# Patient Record
Sex: FEMALE | NOT HISPANIC OR LATINO | URBAN - METROPOLITAN AREA
[De-identification: names, ages, dates, MRNs, and addresses within clinical notes are randomized per-mention and may not be internally consistent; named-entity substitution may affect disease eponyms.]

---

## 2017-05-26 ENCOUNTER — CHARTING TRANS (OUTPATIENT)
Dept: FAMILY MEDICINE | Age: 67
End: 2017-05-26

## 2017-05-31 ENCOUNTER — IMAGING SERVICES (OUTPATIENT)
Dept: OTHER | Age: 67
End: 2017-05-31

## 2017-06-01 ENCOUNTER — CHARTING TRANS (OUTPATIENT)
Dept: OTHER | Age: 67
End: 2017-06-01

## 2017-12-19 ENCOUNTER — IMAGING SERVICES (OUTPATIENT)
Dept: OTHER | Age: 67
End: 2017-12-19

## 2018-11-28 VITALS
RESPIRATION RATE: 16 BRPM | DIASTOLIC BLOOD PRESSURE: 70 MMHG | WEIGHT: 226 LBS | OXYGEN SATURATION: 99 % | TEMPERATURE: 97.3 F | SYSTOLIC BLOOD PRESSURE: 148 MMHG | HEART RATE: 84 BPM

## 2022-06-09 ENCOUNTER — OFFICE VISIT (OUTPATIENT)
Dept: FAMILY MEDICINE CLINIC | Facility: CLINIC | Age: 72
End: 2022-06-09
Payer: MEDICARE

## 2022-06-09 VITALS
RESPIRATION RATE: 16 BRPM | SYSTOLIC BLOOD PRESSURE: 136 MMHG | WEIGHT: 220 LBS | BODY MASS INDEX: 35.36 KG/M2 | OXYGEN SATURATION: 98 % | DIASTOLIC BLOOD PRESSURE: 80 MMHG | TEMPERATURE: 98 F | HEART RATE: 74 BPM | HEIGHT: 66 IN

## 2022-06-09 DIAGNOSIS — Z23 NEED FOR PNEUMOCOCCAL VACCINATION: ICD-10-CM

## 2022-06-09 DIAGNOSIS — Z12.31 ENCOUNTER FOR SCREENING MAMMOGRAM FOR MALIGNANT NEOPLASM OF BREAST: ICD-10-CM

## 2022-06-09 DIAGNOSIS — Z12.11 SCREENING FOR COLON CANCER: ICD-10-CM

## 2022-06-09 DIAGNOSIS — H90.0 CONDUCTIVE HEARING LOSS, BILATERAL: ICD-10-CM

## 2022-06-09 DIAGNOSIS — Z23 NEED FOR ZOSTER VACCINATION: ICD-10-CM

## 2022-06-09 DIAGNOSIS — Z13.6 SCREENING FOR CARDIOVASCULAR CONDITION: ICD-10-CM

## 2022-06-09 DIAGNOSIS — Z13.820 OSTEOPOROSIS SCREENING: ICD-10-CM

## 2022-06-09 DIAGNOSIS — Z13.1 SCREENING FOR DIABETES MELLITUS (DM): ICD-10-CM

## 2022-06-09 DIAGNOSIS — K62.5 RECTAL BLEEDING: ICD-10-CM

## 2022-06-09 DIAGNOSIS — Z11.59 NEED FOR HEPATITIS C SCREENING TEST: ICD-10-CM

## 2022-06-09 DIAGNOSIS — Z00.00 ENCOUNTER FOR ANNUAL HEALTH EXAMINATION: Primary | ICD-10-CM

## 2022-06-09 RX ORDER — ZOSTER VACCINE RECOMBINANT, ADJUVANTED 50 MCG/0.5
50 KIT INTRAMUSCULAR ONCE
Qty: 1 EACH | Refills: 1 | Status: SHIPPED | OUTPATIENT
Start: 2022-06-09 | End: 2022-06-09

## 2022-06-09 RX ORDER — ASPIRIN 81 MG/1
81 TABLET ORAL DAILY
Qty: 365 TABLET | Refills: 0 | Status: CANCELLED | OUTPATIENT
Start: 2022-06-09 | End: 2023-06-09

## 2022-06-09 RX ORDER — ERGOCALCIFEROL (VITAMIN D2) 200 MCG/ML
DROPS ORAL AS DIRECTED
COMMUNITY

## 2022-06-16 LAB
ABSOLUTE BASOPHILS: 73 CELLS/UL (ref 0–200)
ABSOLUTE EOSINOPHILS: 317 CELLS/UL (ref 15–500)
ABSOLUTE LYMPHOCYTES: 2039 CELLS/UL (ref 850–3900)
ABSOLUTE MONOCYTES: 469 CELLS/UL (ref 200–950)
ABSOLUTE NEUTROPHILS: 3703 CELLS/UL (ref 1500–7800)
ALBUMIN/GLOBULIN RATIO: 1.5 (CALC) (ref 1–2.5)
ALBUMIN: 3.9 G/DL (ref 3.6–5.1)
ALKALINE PHOSPHATASE: 76 U/L (ref 37–153)
ALT: 9 U/L (ref 6–29)
AST: 11 U/L (ref 10–35)
BASOPHILS: 1.1 %
BILIRUBIN, TOTAL: 0.4 MG/DL (ref 0.2–1.2)
BUN: 20 MG/DL (ref 7–25)
CALCIUM: 9.5 MG/DL (ref 8.6–10.4)
CARBON DIOXIDE: 25 MMOL/L (ref 20–32)
CHLORIDE: 104 MMOL/L (ref 98–110)
CHOL/HDLC RATIO: 4.8 (CALC)
CHOLESTEROL, TOTAL: 218 MG/DL
CREATININE: 0.9 MG/DL (ref 0.6–0.93)
EGFR IF AFRICN AM: 74 ML/MIN/1.73M2
EGFR IF NONAFRICN AM: 64 ML/MIN/1.73M2
EOSINOPHILS: 4.8 %
GLOBULIN: 2.6 G/DL (CALC) (ref 1.9–3.7)
GLUCOSE: 213 MG/DL (ref 65–99)
HDL CHOLESTEROL: 45 MG/DL
HEMATOCRIT: 40.9 % (ref 35–45)
HEMOGLOBIN A1C: 9.1 % OF TOTAL HGB
HEMOGLOBIN: 12.9 G/DL (ref 11.7–15.5)
LDL-CHOLESTEROL: 147 MG/DL (CALC)
LYMPHOCYTES: 30.9 %
MCH: 26.8 PG (ref 27–33)
MCHC: 31.5 G/DL (ref 32–36)
MCV: 85 FL (ref 80–100)
MONOCYTES: 7.1 %
MPV: 9.8 FL (ref 7.5–12.5)
NEUTROPHILS: 56.1 %
NON-HDL CHOLESTEROL: 173 MG/DL (CALC)
PLATELET COUNT: 305 THOUSAND/UL (ref 140–400)
POTASSIUM: 4.6 MMOL/L (ref 3.5–5.3)
PROTEIN, TOTAL: 6.5 G/DL (ref 6.1–8.1)
RDW: 13.1 % (ref 11–15)
RED BLOOD CELL COUNT: 4.81 MILLION/UL (ref 3.8–5.1)
SIGNAL TO CUT-OFF: 0.03
SODIUM: 137 MMOL/L (ref 135–146)
TRIGLYCERIDES: 135 MG/DL
TSH W/REFLEX TO FT4: 1.85 MIU/L (ref 0.4–4.5)
WHITE BLOOD CELL COUNT: 6.6 THOUSAND/UL (ref 3.8–10.8)

## 2023-01-18 ENCOUNTER — ANESTHESIA EVENT (OUTPATIENT)
Dept: ENDOSCOPY | Facility: HOSPITAL | Age: 73
End: 2023-01-18
Payer: MEDICARE

## 2023-01-18 ENCOUNTER — ANESTHESIA (OUTPATIENT)
Dept: ENDOSCOPY | Facility: HOSPITAL | Age: 73
End: 2023-01-18
Payer: MEDICARE

## 2023-01-18 ENCOUNTER — HOSPITAL ENCOUNTER (OUTPATIENT)
Facility: HOSPITAL | Age: 73
Setting detail: HOSPITAL OUTPATIENT SURGERY
Discharge: HOME | End: 2023-01-18
Attending: INTERNAL MEDICINE | Admitting: INTERNAL MEDICINE
Payer: MEDICARE

## 2023-01-18 VITALS
TEMPERATURE: 98 F | HEART RATE: 74 BPM | DIASTOLIC BLOOD PRESSURE: 83 MMHG | OXYGEN SATURATION: 99 % | RESPIRATION RATE: 16 BRPM | SYSTOLIC BLOOD PRESSURE: 143 MMHG | WEIGHT: 220 LBS | BODY MASS INDEX: 34.53 KG/M2 | HEIGHT: 67 IN

## 2023-01-18 DIAGNOSIS — Z12.11 SCREEN FOR COLON CANCER: ICD-10-CM

## 2023-01-18 PROCEDURE — 0DJD8ZZ INSPECTION OF LOWER INTESTINAL TRACT, VIA NATURAL OR ARTIFICIAL OPENING ENDOSCOPIC: ICD-10-PCS | Performed by: INTERNAL MEDICINE

## 2023-01-18 RX ORDER — NALOXONE HYDROCHLORIDE 0.4 MG/ML
80 INJECTION, SOLUTION INTRAMUSCULAR; INTRAVENOUS; SUBCUTANEOUS AS NEEDED
Status: DISCONTINUED | OUTPATIENT
Start: 2023-01-18 | End: 2023-01-18

## 2023-01-18 RX ORDER — HYDROMORPHONE HYDROCHLORIDE 1 MG/ML
0.6 INJECTION, SOLUTION INTRAMUSCULAR; INTRAVENOUS; SUBCUTANEOUS EVERY 5 MIN PRN
Status: DISCONTINUED | OUTPATIENT
Start: 2023-01-18 | End: 2023-01-18

## 2023-01-18 RX ORDER — HYDROMORPHONE HYDROCHLORIDE 1 MG/ML
0.2 INJECTION, SOLUTION INTRAMUSCULAR; INTRAVENOUS; SUBCUTANEOUS EVERY 5 MIN PRN
Status: DISCONTINUED | OUTPATIENT
Start: 2023-01-18 | End: 2023-01-18

## 2023-01-18 RX ORDER — SODIUM CHLORIDE, SODIUM LACTATE, POTASSIUM CHLORIDE, CALCIUM CHLORIDE 600; 310; 30; 20 MG/100ML; MG/100ML; MG/100ML; MG/100ML
INJECTION, SOLUTION INTRAVENOUS CONTINUOUS
Status: DISCONTINUED | OUTPATIENT
Start: 2023-01-18 | End: 2023-01-18

## 2023-01-18 RX ORDER — LIDOCAINE HYDROCHLORIDE 10 MG/ML
INJECTION, SOLUTION EPIDURAL; INFILTRATION; INTRACAUDAL; PERINEURAL AS NEEDED
Status: DISCONTINUED | OUTPATIENT
Start: 2023-01-18 | End: 2023-01-18 | Stop reason: SURG

## 2023-01-18 RX ORDER — HYDROMORPHONE HYDROCHLORIDE 1 MG/ML
0.4 INJECTION, SOLUTION INTRAMUSCULAR; INTRAVENOUS; SUBCUTANEOUS EVERY 5 MIN PRN
Status: DISCONTINUED | OUTPATIENT
Start: 2023-01-18 | End: 2023-01-18

## 2023-01-18 RX ADMIN — SODIUM CHLORIDE, SODIUM LACTATE, POTASSIUM CHLORIDE, CALCIUM CHLORIDE: 600; 310; 30; 20 INJECTION, SOLUTION INTRAVENOUS at 08:34:00

## 2023-01-18 RX ADMIN — LIDOCAINE HYDROCHLORIDE 25 MG: 10 INJECTION, SOLUTION EPIDURAL; INFILTRATION; INTRACAUDAL; PERINEURAL at 08:36:00

## 2023-01-18 RX ADMIN — SODIUM CHLORIDE, SODIUM LACTATE, POTASSIUM CHLORIDE, CALCIUM CHLORIDE: 600; 310; 30; 20 INJECTION, SOLUTION INTRAVENOUS at 08:58:00

## 2023-05-17 ENCOUNTER — HOSPITAL ENCOUNTER (OUTPATIENT)
Dept: BONE DENSITY | Age: 73
Discharge: HOME | End: 2023-05-17
Attending: FAMILY MEDICINE
Payer: MEDICARE

## 2023-05-17 ENCOUNTER — HOSPITAL ENCOUNTER (OUTPATIENT)
Dept: MAMMOGRAPHY | Age: 73
Discharge: HOME | End: 2023-05-17
Attending: FAMILY MEDICINE
Payer: MEDICARE

## 2023-05-17 DIAGNOSIS — Z13.820 OSTEOPOROSIS SCREENING: ICD-10-CM

## 2023-05-17 DIAGNOSIS — Z12.31 ENCOUNTER FOR SCREENING MAMMOGRAM FOR MALIGNANT NEOPLASM OF BREAST: ICD-10-CM

## 2023-05-17 PROCEDURE — 77080 DXA BONE DENSITY AXIAL: CPT | Performed by: FAMILY MEDICINE

## 2023-05-17 PROCEDURE — 77063 BREAST TOMOSYNTHESIS BI: CPT | Performed by: FAMILY MEDICINE

## 2023-05-17 PROCEDURE — 77067 SCR MAMMO BI INCL CAD: CPT | Performed by: FAMILY MEDICINE

## 2023-06-13 ENCOUNTER — OFFICE VISIT (OUTPATIENT)
Dept: FAMILY MEDICINE CLINIC | Facility: CLINIC | Age: 73
End: 2023-06-13
Payer: MEDICARE

## 2023-06-13 VITALS
OXYGEN SATURATION: 99 % | BODY MASS INDEX: 34.61 KG/M2 | WEIGHT: 220.5 LBS | HEART RATE: 84 BPM | DIASTOLIC BLOOD PRESSURE: 80 MMHG | TEMPERATURE: 97 F | SYSTOLIC BLOOD PRESSURE: 148 MMHG | HEIGHT: 67 IN | RESPIRATION RATE: 18 BRPM

## 2023-06-13 DIAGNOSIS — M85.851 OSTEOPENIA OF BOTH HIPS: ICD-10-CM

## 2023-06-13 DIAGNOSIS — H90.0 CONDUCTIVE HEARING LOSS, BILATERAL: ICD-10-CM

## 2023-06-13 DIAGNOSIS — Z00.00 ENCOUNTER FOR ANNUAL HEALTH EXAMINATION: Primary | ICD-10-CM

## 2023-06-13 DIAGNOSIS — E11.9 TYPE 2 DIABETES MELLITUS WITHOUT COMPLICATION, WITHOUT LONG-TERM CURRENT USE OF INSULIN (HCC): ICD-10-CM

## 2023-06-13 DIAGNOSIS — M85.852 OSTEOPENIA OF BOTH HIPS: ICD-10-CM

## 2023-06-13 LAB
CARTRIDGE LOT#: 595 NUMERIC
HEMOGLOBIN A1C: 9.7 % (ref 4.3–5.6)

## 2023-06-13 RX ORDER — BLOOD SUGAR DIAGNOSTIC
STRIP MISCELLANEOUS
Qty: 100 STRIP | Refills: 0 | Status: SHIPPED | OUTPATIENT
Start: 2023-06-13

## 2023-06-13 RX ORDER — LISINOPRIL 10 MG/1
10 TABLET ORAL DAILY
Qty: 90 TABLET | Refills: 3 | Status: SHIPPED | OUTPATIENT
Start: 2023-06-13 | End: 2024-06-07

## 2023-06-13 RX ORDER — METFORMIN HYDROCHLORIDE 500 MG/1
500 TABLET, EXTENDED RELEASE ORAL 2 TIMES DAILY WITH MEALS
Qty: 180 TABLET | Refills: 0 | Status: SHIPPED | OUTPATIENT
Start: 2023-06-13 | End: 2023-09-11

## 2023-06-13 RX ORDER — ATORVASTATIN CALCIUM 10 MG/1
10 TABLET, FILM COATED ORAL NIGHTLY
Qty: 90 TABLET | Refills: 0 | Status: SHIPPED | OUTPATIENT
Start: 2023-06-13 | End: 2023-09-11

## 2023-06-13 RX ORDER — BLOOD-GLUCOSE METER
EACH MISCELLANEOUS
Qty: 1 KIT | Refills: 0 | Status: SHIPPED | OUTPATIENT
Start: 2023-06-13

## 2023-06-21 ENCOUNTER — HOSPITAL ENCOUNTER (OUTPATIENT)
Dept: MAMMOGRAPHY | Facility: HOSPITAL | Age: 73
Discharge: HOME | End: 2023-06-21
Attending: FAMILY MEDICINE
Payer: MEDICARE

## 2023-06-21 DIAGNOSIS — R92.2 INCONCLUSIVE MAMMOGRAM: ICD-10-CM

## 2023-06-21 PROCEDURE — 76642 ULTRASOUND BREAST LIMITED: CPT | Performed by: FAMILY MEDICINE

## 2023-06-22 ENCOUNTER — TELEPHONE (OUTPATIENT)
Dept: FAMILY MEDICINE CLINIC | Facility: CLINIC | Age: 73
End: 2023-06-22

## 2023-06-22 DIAGNOSIS — N60.01 CYST (SOLITARY) OF BREAST, RIGHT: Primary | ICD-10-CM

## 2023-06-22 NOTE — TELEPHONE ENCOUNTER
Recd request and authorization from [facility] -Chart Review Request. Request is for all records from 1/1/22 to present. Please fax to [phone]. Faxed to [employer] for processing.

## 2023-07-10 ENCOUNTER — HOSPITAL ENCOUNTER (EMERGENCY)
Facility: HOSPITAL | Age: 73
Discharge: HOME | End: 2023-07-10
Attending: EMERGENCY MEDICINE
Payer: MEDICARE

## 2023-07-10 VITALS
OXYGEN SATURATION: 95 % | BODY MASS INDEX: 34.53 KG/M2 | RESPIRATION RATE: 20 BRPM | HEIGHT: 67 IN | HEART RATE: 68 BPM | DIASTOLIC BLOOD PRESSURE: 82 MMHG | TEMPERATURE: 98 F | SYSTOLIC BLOOD PRESSURE: 158 MMHG | WEIGHT: 220 LBS

## 2023-07-10 DIAGNOSIS — G44.209 TENSION HEADACHE: ICD-10-CM

## 2023-07-10 DIAGNOSIS — I10 PRIMARY HYPERTENSION: Primary | ICD-10-CM

## 2023-07-10 LAB
ALBUMIN SERPL-MCNC: 3.7 G/DL (ref 3.4–5)
ALBUMIN/GLOB SERPL: 0.9 {RATIO} (ref 1–2)
ALP LIVER SERPL-CCNC: 77 U/L
ALT SERPL-CCNC: 16 U/L
ANION GAP SERPL CALC-SCNC: 6 MMOL/L (ref 0–18)
AST SERPL-CCNC: 21 U/L (ref 15–37)
BASOPHILS # BLD AUTO: 0.07 X10(3) UL (ref 0–0.2)
BASOPHILS NFR BLD AUTO: 0.9 %
BILIRUB SERPL-MCNC: 0.4 MG/DL (ref 0.1–2)
BUN BLD-MCNC: 19 MG/DL (ref 7–18)
CALCIUM BLD-MCNC: 9.5 MG/DL (ref 8.5–10.1)
CHLORIDE SERPL-SCNC: 110 MMOL/L (ref 98–112)
CO2 SERPL-SCNC: 23 MMOL/L (ref 21–32)
CREAT BLD-MCNC: 0.98 MG/DL
EOSINOPHIL # BLD AUTO: 0.32 X10(3) UL (ref 0–0.7)
EOSINOPHIL NFR BLD AUTO: 4.2 %
ERYTHROCYTE [DISTWIDTH] IN BLOOD BY AUTOMATED COUNT: 13.2 %
GFR SERPLBLD BASED ON 1.73 SQ M-ARVRAT: 61 ML/MIN/1.73M2 (ref 60–?)
GLOBULIN PLAS-MCNC: 4 G/DL (ref 2.8–4.4)
GLUCOSE BLD-MCNC: 153 MG/DL (ref 70–99)
HCT VFR BLD AUTO: 40.3 %
HGB BLD-MCNC: 13 G/DL
IMM GRANULOCYTES # BLD AUTO: 0.02 X10(3) UL (ref 0–1)
IMM GRANULOCYTES NFR BLD: 0.3 %
LYMPHOCYTES # BLD AUTO: 2.03 X10(3) UL (ref 1–4)
LYMPHOCYTES NFR BLD AUTO: 26.5 %
MCH RBC QN AUTO: 26.6 PG (ref 26–34)
MCHC RBC AUTO-ENTMCNC: 32.3 G/DL (ref 31–37)
MCV RBC AUTO: 82.4 FL
MONOCYTES # BLD AUTO: 0.55 X10(3) UL (ref 0.1–1)
MONOCYTES NFR BLD AUTO: 7.2 %
NEUTROPHILS # BLD AUTO: 4.67 X10 (3) UL (ref 1.5–7.7)
NEUTROPHILS # BLD AUTO: 4.67 X10(3) UL (ref 1.5–7.7)
NEUTROPHILS NFR BLD AUTO: 60.9 %
OSMOLALITY SERPL CALC.SUM OF ELEC: 293 MOSM/KG (ref 275–295)
PLATELET # BLD AUTO: 290 10(3)UL (ref 150–450)
POTASSIUM SERPL-SCNC: 4.5 MMOL/L (ref 3.5–5.1)
PROT SERPL-MCNC: 7.7 G/DL (ref 6.4–8.2)
RBC # BLD AUTO: 4.89 X10(6)UL
SODIUM SERPL-SCNC: 139 MMOL/L (ref 136–145)
WBC # BLD AUTO: 7.7 X10(3) UL (ref 4–11)

## 2023-07-10 PROCEDURE — 93005 ELECTROCARDIOGRAM TRACING: CPT

## 2023-07-10 PROCEDURE — 96376 TX/PRO/DX INJ SAME DRUG ADON: CPT

## 2023-07-10 PROCEDURE — 85025 COMPLETE CBC W/AUTO DIFF WBC: CPT | Performed by: EMERGENCY MEDICINE

## 2023-07-10 PROCEDURE — 93010 ELECTROCARDIOGRAM REPORT: CPT

## 2023-07-10 PROCEDURE — 80053 COMPREHEN METABOLIC PANEL: CPT | Performed by: EMERGENCY MEDICINE

## 2023-07-10 PROCEDURE — 96374 THER/PROPH/DIAG INJ IV PUSH: CPT

## 2023-07-10 PROCEDURE — 99284 EMERGENCY DEPT VISIT MOD MDM: CPT

## 2023-07-10 PROCEDURE — 99285 EMERGENCY DEPT VISIT HI MDM: CPT

## 2023-07-10 RX ORDER — AMLODIPINE BESYLATE 5 MG/1
5 TABLET ORAL DAILY
Qty: 30 TABLET | Refills: 0 | Status: SHIPPED | OUTPATIENT
Start: 2023-07-10 | End: 2023-07-13

## 2023-07-10 RX ORDER — LABETALOL HYDROCHLORIDE 5 MG/ML
20 INJECTION, SOLUTION INTRAVENOUS ONCE
Status: COMPLETED | OUTPATIENT
Start: 2023-07-10 | End: 2023-07-10

## 2023-07-10 RX ORDER — KETOROLAC TROMETHAMINE 30 MG/ML
30 INJECTION, SOLUTION INTRAMUSCULAR; INTRAVENOUS ONCE
Status: DISCONTINUED | OUTPATIENT
Start: 2023-07-10 | End: 2023-07-11

## 2023-07-11 ENCOUNTER — TELEPHONE (OUTPATIENT)
Dept: FAMILY MEDICINE CLINIC | Facility: CLINIC | Age: 73
End: 2023-07-11

## 2023-07-11 LAB
ATRIAL RATE: 87 BPM
P AXIS: 31 DEGREES
P-R INTERVAL: 162 MS
Q-T INTERVAL: 356 MS
QRS DURATION: 76 MS
QTC CALCULATION (BEZET): 428 MS
R AXIS: -24 DEGREES
T AXIS: 41 DEGREES
VENTRICULAR RATE: 87 BPM

## 2023-07-11 NOTE — TELEPHONE ENCOUNTER
- Pt is needing an ER follow up for high BP and developed cough. Pt only wants to see . No appointment available. Please advise Joycelyn 30. [phone]  Vee daughter. Can leave a message if no answer.

## 2023-07-11 NOTE — TELEPHONE ENCOUNTER
Future Appointments   Date Time Provider [facility] [place]   7/13/2023 11:00 AM Bg Peters, DO [facility] [address] [facility] [address]   9/14/2023  1:00 PM Acosta Pinto, DO [facility] [address] [facility] [address]

## 2023-07-11 NOTE — TELEPHONE ENCOUNTER
Can we see if she can come in on 7/13/23? There is a blocked spot at 11am, you can use it if she can come in then.

## 2023-07-13 ENCOUNTER — TELEPHONE (OUTPATIENT)
Dept: FAMILY MEDICINE CLINIC | Facility: CLINIC | Age: 73
End: 2023-07-13

## 2023-07-13 ENCOUNTER — OFFICE VISIT (OUTPATIENT)
Dept: FAMILY MEDICINE CLINIC | Facility: CLINIC | Age: 73
End: 2023-07-13
Payer: MEDICARE

## 2023-07-13 VITALS
SYSTOLIC BLOOD PRESSURE: 150 MMHG | WEIGHT: 212.19 LBS | BODY MASS INDEX: 33 KG/M2 | TEMPERATURE: 98 F | DIASTOLIC BLOOD PRESSURE: 88 MMHG

## 2023-07-13 DIAGNOSIS — E11.9 TYPE 2 DIABETES MELLITUS WITHOUT COMPLICATION, WITHOUT LONG-TERM CURRENT USE OF INSULIN (HCC): Primary | ICD-10-CM

## 2023-07-13 DIAGNOSIS — I10 PRIMARY HYPERTENSION: ICD-10-CM

## 2023-07-13 PROCEDURE — 99214 OFFICE O/P EST MOD 30 MIN: CPT | Performed by: FAMILY MEDICINE

## 2023-07-13 PROCEDURE — 3077F SYST BP >= 140 MM HG: CPT | Performed by: FAMILY MEDICINE

## 2023-07-13 PROCEDURE — 3079F DIAST BP 80-89 MM HG: CPT | Performed by: FAMILY MEDICINE

## 2023-07-13 PROCEDURE — 1159F MED LIST DOCD IN RCRD: CPT | Performed by: FAMILY MEDICINE

## 2023-07-13 PROCEDURE — 1170F FXNL STATUS ASSESSED: CPT | Performed by: FAMILY MEDICINE

## 2023-07-13 PROCEDURE — 1160F RVW MEDS BY RX/DR IN RCRD: CPT | Performed by: FAMILY MEDICINE

## 2023-07-13 RX ORDER — BENZONATATE 100 MG/1
100 CAPSULE ORAL 3 TIMES DAILY PRN
Qty: 30 CAPSULE | Refills: 0 | Status: SHIPPED | OUTPATIENT
Start: 2023-07-13

## 2023-07-13 RX ORDER — LOSARTAN POTASSIUM 50 MG/1
50 TABLET ORAL DAILY
Qty: 90 TABLET | Refills: 0 | Status: SHIPPED | OUTPATIENT
Start: 2023-07-13 | End: 2023-10-11

## 2023-07-13 NOTE — TELEPHONE ENCOUNTER
- Pharmacy is calling to verify change to    Disp Refills Start End    losartan 50 MG Oral Tab 90 tablet 0 7/13/2023 10/11/2023    Sig - Route: Take 1 tablet (50 mg total) by mouth daily. - Oral    Sent to pharmacy as: Losartan Potassium 50 MG Oral Tablet (Cozaar)    E-Prescribing Status: Receipt confirmed by pharmacy (7/13/2023 11:15 AM CDT)          Disp Refills Start End    lisinopril 10 MG Oral Tab (Discontinued) 90 tablet 3 6/13/2023 7/13/2023    Sig - Route: Take 1 tablet (10 mg total) by mouth daily. - Oral    Sent to pharmacy as: Lisinopril 10 MG Oral Tablet (Zestril)      Patient picked up Lisinopril on 06/13/2023.     Please advise [address] [place] [address] [id number] [facility], [address] [place] [address] Drive [address] [place] [address] Rd AT [facility] OF [address] [facility], [phone], [phone]

## 2023-07-13 NOTE — TELEPHONE ENCOUNTER
Last OV 7/13/23  PLAN:   Dietary Sodium Restriction  Increased Physical Activity/ Get Regular Aerobic Exercise. Weight loss. DASH Eating Plan  Meds: Stop the lisinopril, start the losartan, stop the amlodipine. Advised to send bp log in 1-2 weeks  Tessalon to help with cough, hopefully it should self resolve.    Patient Education: Reviewed risks of hypertension and principles of treatmen

## 2023-07-13 NOTE — PATIENT INSTRUCTIONS
Monitor your blood pressure once daily at the same time every morning. Please send us a [employer] message with your readings over the next few days. Follow up in 1 - 2 week in the office for blood pressure check if > 140/90. Reduce your salt intake. If you experience, headache, vision change, vomiting, BP >200/100 then go to ER.

## 2023-07-14 ENCOUNTER — PATIENT MESSAGE (OUTPATIENT)
Dept: FAMILY MEDICINE CLINIC | Facility: CLINIC | Age: 73
End: 2023-07-14

## 2023-08-26 ENCOUNTER — APPOINTMENT (OUTPATIENT)
Dept: CT IMAGING | Facility: HOSPITAL | Age: 73
End: 2023-08-26
Attending: EMERGENCY MEDICINE
Payer: MEDICARE

## 2023-08-26 ENCOUNTER — HOSPITAL ENCOUNTER (EMERGENCY)
Facility: HOSPITAL | Age: 73
Discharge: HOME | End: 2023-08-26
Attending: EMERGENCY MEDICINE
Payer: MEDICARE

## 2023-08-26 VITALS
DIASTOLIC BLOOD PRESSURE: 87 MMHG | SYSTOLIC BLOOD PRESSURE: 141 MMHG | BODY MASS INDEX: 33 KG/M2 | RESPIRATION RATE: 17 BRPM | HEART RATE: 79 BPM | OXYGEN SATURATION: 94 % | TEMPERATURE: 98 F | WEIGHT: 211.63 LBS

## 2023-08-26 DIAGNOSIS — R51.9 NONINTRACTABLE HEADACHE, UNSPECIFIED CHRONICITY PATTERN, UNSPECIFIED HEADACHE TYPE: Primary | ICD-10-CM

## 2023-08-26 LAB
ALBUMIN SERPL-MCNC: 3.8 G/DL (ref 3.4–5)
ALBUMIN/GLOB SERPL: 1 {RATIO} (ref 1–2)
ALP LIVER SERPL-CCNC: 68 U/L
ALT SERPL-CCNC: 17 U/L
ANION GAP SERPL CALC-SCNC: 5 MMOL/L (ref 0–18)
AST SERPL-CCNC: 15 U/L (ref 15–37)
BASOPHILS # BLD AUTO: 0.07 X10(3) UL (ref 0–0.2)
BASOPHILS NFR BLD AUTO: 0.9 %
BILIRUB SERPL-MCNC: 0.4 MG/DL (ref 0.1–2)
BUN BLD-MCNC: 23 MG/DL (ref 7–18)
CALCIUM BLD-MCNC: 9.6 MG/DL (ref 8.5–10.1)
CHLORIDE SERPL-SCNC: 108 MMOL/L (ref 98–112)
CO2 SERPL-SCNC: 25 MMOL/L (ref 21–32)
CREAT BLD-MCNC: 1.21 MG/DL
EGFRCR SERPLBLD CKD-EPI 2021: 47 ML/MIN/1.73M2 (ref 60–?)
EOSINOPHIL # BLD AUTO: 0.29 X10(3) UL (ref 0–0.7)
EOSINOPHIL NFR BLD AUTO: 3.9 %
ERYTHROCYTE [DISTWIDTH] IN BLOOD BY AUTOMATED COUNT: 13.4 %
GLOBULIN PLAS-MCNC: 3.9 G/DL (ref 2.8–4.4)
GLUCOSE BLD-MCNC: 129 MG/DL (ref 70–99)
HCT VFR BLD AUTO: 38.3 %
HGB BLD-MCNC: 13 G/DL
IMM GRANULOCYTES # BLD AUTO: 0.01 X10(3) UL (ref 0–1)
IMM GRANULOCYTES NFR BLD: 0.1 %
LYMPHOCYTES # BLD AUTO: 2.1 X10(3) UL (ref 1–4)
LYMPHOCYTES NFR BLD AUTO: 28.2 %
MCH RBC QN AUTO: 27.4 PG (ref 26–34)
MCHC RBC AUTO-ENTMCNC: 33.9 G/DL (ref 31–37)
MCV RBC AUTO: 80.8 FL
MONOCYTES # BLD AUTO: 0.58 X10(3) UL (ref 0.1–1)
MONOCYTES NFR BLD AUTO: 7.8 %
NEUTROPHILS # BLD AUTO: 4.4 X10 (3) UL (ref 1.5–7.7)
NEUTROPHILS # BLD AUTO: 4.4 X10(3) UL (ref 1.5–7.7)
NEUTROPHILS NFR BLD AUTO: 59.1 %
OSMOLALITY SERPL CALC.SUM OF ELEC: 291 MOSM/KG (ref 275–295)
PLATELET # BLD AUTO: 321 10(3)UL (ref 150–450)
POTASSIUM SERPL-SCNC: 4.2 MMOL/L (ref 3.5–5.1)
PROT SERPL-MCNC: 7.7 G/DL (ref 6.4–8.2)
RBC # BLD AUTO: 4.74 X10(6)UL
SODIUM SERPL-SCNC: 138 MMOL/L (ref 136–145)
TROPONIN I HIGH SENSITIVITY: 6 NG/L
WBC # BLD AUTO: 7.5 X10(3) UL (ref 4–11)

## 2023-08-26 PROCEDURE — 70450 CT HEAD/BRAIN W/O DYE: CPT | Performed by: EMERGENCY MEDICINE

## 2023-08-26 PROCEDURE — 99284 EMERGENCY DEPT VISIT MOD MDM: CPT

## 2023-08-26 PROCEDURE — 93005 ELECTROCARDIOGRAM TRACING: CPT

## 2023-08-26 PROCEDURE — 85025 COMPLETE CBC W/AUTO DIFF WBC: CPT | Performed by: EMERGENCY MEDICINE

## 2023-08-26 PROCEDURE — 80053 COMPREHEN METABOLIC PANEL: CPT | Performed by: EMERGENCY MEDICINE

## 2023-08-26 PROCEDURE — 99285 EMERGENCY DEPT VISIT HI MDM: CPT

## 2023-08-26 PROCEDURE — 36415 COLL VENOUS BLD VENIPUNCTURE: CPT

## 2023-08-26 PROCEDURE — 93010 ELECTROCARDIOGRAM REPORT: CPT

## 2023-08-26 PROCEDURE — 84484 ASSAY OF TROPONIN QUANT: CPT | Performed by: EMERGENCY MEDICINE

## 2023-08-26 NOTE — ED INITIAL ASSESSMENT (HPI)
Pt woke up with tingling on the left side of her face. No droop, equal smile. Pt has been having  headaches and noticed increased HTN.

## 2023-08-26 NOTE — DISCHARGE INSTRUCTIONS
Monitor your blood pressure daily Tylenol or Advil as needed for any pain or symptoms follow-up with your doctor over the next 2 days return for worsening symptoms.

## 2023-08-27 LAB
ATRIAL RATE: 93 BPM
P AXIS: 15 DEGREES
P-R INTERVAL: 154 MS
Q-T INTERVAL: 366 MS
QRS DURATION: 76 MS
QTC CALCULATION (BEZET): 455 MS
R AXIS: -34 DEGREES
T AXIS: 41 DEGREES
VENTRICULAR RATE: 93 BPM

## 2023-09-05 ENCOUNTER — OFFICE VISIT (OUTPATIENT)
Dept: FAMILY MEDICINE CLINIC | Facility: CLINIC | Age: 73
End: 2023-09-05
Payer: MEDICARE

## 2023-09-05 VITALS
SYSTOLIC BLOOD PRESSURE: 140 MMHG | TEMPERATURE: 98 F | WEIGHT: 206 LBS | HEART RATE: 80 BPM | HEIGHT: 67 IN | BODY MASS INDEX: 32.33 KG/M2 | OXYGEN SATURATION: 99 % | DIASTOLIC BLOOD PRESSURE: 80 MMHG | RESPIRATION RATE: 18 BRPM

## 2023-09-05 DIAGNOSIS — I10 PRIMARY HYPERTENSION: ICD-10-CM

## 2023-09-05 DIAGNOSIS — E11.9 TYPE 2 DIABETES MELLITUS WITHOUT COMPLICATION, WITHOUT LONG-TERM CURRENT USE OF INSULIN (HCC): ICD-10-CM

## 2023-09-05 PROCEDURE — 3077F SYST BP >= 140 MM HG: CPT | Performed by: FAMILY MEDICINE

## 2023-09-05 PROCEDURE — 99214 OFFICE O/P EST MOD 30 MIN: CPT | Performed by: FAMILY MEDICINE

## 2023-09-05 PROCEDURE — 3079F DIAST BP 80-89 MM HG: CPT | Performed by: FAMILY MEDICINE

## 2023-09-05 PROCEDURE — 1159F MED LIST DOCD IN RCRD: CPT | Performed by: FAMILY MEDICINE

## 2023-09-05 PROCEDURE — 1160F RVW MEDS BY RX/DR IN RCRD: CPT | Performed by: FAMILY MEDICINE

## 2023-09-05 PROCEDURE — 3008F BODY MASS INDEX DOCD: CPT | Performed by: FAMILY MEDICINE

## 2023-09-05 RX ORDER — LOSARTAN POTASSIUM 50 MG/1
50 TABLET ORAL DAILY
Qty: 90 TABLET | Refills: 0 | Status: SHIPPED | OUTPATIENT
Start: 2023-09-05 | End: 2023-12-04

## 2023-09-05 RX ORDER — METFORMIN HYDROCHLORIDE 500 MG/1
500 TABLET, EXTENDED RELEASE ORAL 2 TIMES DAILY WITH MEALS
Qty: 180 TABLET | Refills: 0 | Status: SHIPPED | OUTPATIENT
Start: 2023-09-05 | End: 2023-12-04

## 2023-09-05 RX ORDER — ASPIRIN 81 MG/1
TABLET, CHEWABLE ORAL DAILY
COMMUNITY

## 2023-09-11 RX ORDER — ATORVASTATIN CALCIUM 10 MG/1
10 TABLET, FILM COATED ORAL DAILY
Qty: 90 TABLET | Refills: 0 | Status: SHIPPED | OUTPATIENT
Start: 2023-09-11

## 2023-09-11 NOTE — TELEPHONE ENCOUNTER
Requested Renewals     Name from pharmacy: ATORVASTATIN 10MG TABLETS         Will file in chart as: ATORVASTATIN 10 MG Oral Tab    Sig: TAKE 1 TABLET BY MOUTH EVERY DAY    Disp: 90 tablet    Refills: 0 (Pharmacy requested: Not specified)    Start: 9/9/2023    Class: Normal    Non-formulary    Last refill: 6/13/2023     Cholesterol Medication Protocol [id number]/09/2023 03:01 PM   Protocol Details Lipid panel within past 12 months    ALT < 80    ALT resulted within past year    Appointment within past 12 or next 3 months             Future Appointments   Date Time Provider Farideh Weir   9/19/2023  1:30 PM Rhenda Essex, DO [facility] [address] [facility] [address]     LOV: 9/5/23  Labs have been ordered    -medication pended for review

## 2023-09-14 ENCOUNTER — TELEPHONE (OUTPATIENT)
Dept: FAMILY MEDICINE CLINIC | Facility: CLINIC | Age: 73
End: 2023-09-14

## 2023-09-19 ENCOUNTER — OFFICE VISIT (OUTPATIENT)
Dept: FAMILY MEDICINE CLINIC | Facility: CLINIC | Age: 73
End: 2023-09-19
Payer: MEDICARE

## 2023-09-19 VITALS
WEIGHT: 202 LBS | RESPIRATION RATE: 16 BRPM | TEMPERATURE: 98 F | DIASTOLIC BLOOD PRESSURE: 82 MMHG | HEART RATE: 83 BPM | BODY MASS INDEX: 31.71 KG/M2 | HEIGHT: 67 IN | OXYGEN SATURATION: 97 % | SYSTOLIC BLOOD PRESSURE: 122 MMHG

## 2023-09-19 DIAGNOSIS — I10 PRIMARY HYPERTENSION: ICD-10-CM

## 2023-09-19 DIAGNOSIS — Z23 FLU VACCINE NEED: ICD-10-CM

## 2023-09-19 DIAGNOSIS — E11.9 TYPE 2 DIABETES MELLITUS WITHOUT COMPLICATION, WITHOUT LONG-TERM CURRENT USE OF INSULIN (HCC): Primary | ICD-10-CM

## 2023-09-19 DIAGNOSIS — I95.1 POSTURAL HYPOTENSION: ICD-10-CM

## 2023-09-19 DIAGNOSIS — N18.31 STAGE 3A CHRONIC KIDNEY DISEASE (HCC): ICD-10-CM

## 2023-09-19 PROBLEM — N18.30 CKD (CHRONIC KIDNEY DISEASE) STAGE 3, GFR 30-59 ML/MIN (HCC): Chronic | Status: ACTIVE | Noted: 2023-09-19

## 2023-09-19 LAB
CARTRIDGE EXPIRATION DATE: ABNORMAL DATE
CARTRIDGE LOT#: 612 NUMERIC
CREAT UR-SCNC: 110 MG/DL
HEMOGLOBIN A1C: 7.5 % (ref 4.3–5.6)
MICROALBUMIN UR-MCNC: 1.1 MG/DL
MICROALBUMIN/CREAT 24H UR-RTO: 10 UG/MG (ref ?–30)

## 2023-09-19 PROCEDURE — 83036 HEMOGLOBIN GLYCOSYLATED A1C: CPT | Performed by: FAMILY MEDICINE

## 2023-09-19 PROCEDURE — 3051F HG A1C>EQUAL 7.0%<8.0%: CPT | Performed by: FAMILY MEDICINE

## 2023-09-19 PROCEDURE — 82043 UR ALBUMIN QUANTITATIVE: CPT | Performed by: FAMILY MEDICINE

## 2023-09-19 PROCEDURE — 82570 ASSAY OF URINE CREATININE: CPT | Performed by: FAMILY MEDICINE

## 2023-09-19 PROCEDURE — 3008F BODY MASS INDEX DOCD: CPT | Performed by: FAMILY MEDICINE

## 2023-09-19 PROCEDURE — 1159F MED LIST DOCD IN RCRD: CPT | Performed by: FAMILY MEDICINE

## 2023-09-19 PROCEDURE — 3074F SYST BP LT 130 MM HG: CPT | Performed by: FAMILY MEDICINE

## 2023-09-19 PROCEDURE — 3079F DIAST BP 80-89 MM HG: CPT | Performed by: FAMILY MEDICINE

## 2023-09-19 PROCEDURE — 99214 OFFICE O/P EST MOD 30 MIN: CPT | Performed by: FAMILY MEDICINE

## 2023-09-19 RX ORDER — LOSARTAN POTASSIUM 50 MG/1
50 TABLET ORAL DAILY
Qty: 90 TABLET | Refills: 3 | Status: SHIPPED | OUTPATIENT
Start: 2023-09-19 | End: 2024-09-13

## 2023-09-19 RX ORDER — METFORMIN HYDROCHLORIDE 500 MG/1
500 TABLET, EXTENDED RELEASE ORAL 2 TIMES DAILY WITH MEALS
Qty: 180 TABLET | Refills: 0 | Status: SHIPPED | OUTPATIENT
Start: 2023-09-19 | End: 2023-12-18

## 2023-09-19 RX ORDER — ATORVASTATIN CALCIUM 10 MG/1
10 TABLET, FILM COATED ORAL DAILY
Qty: 90 TABLET | Refills: 3 | Status: SHIPPED | OUTPATIENT
Start: 2023-09-19

## 2023-10-06 DIAGNOSIS — I10 PRIMARY HYPERTENSION: ICD-10-CM

## 2023-10-06 DIAGNOSIS — E11.9 TYPE 2 DIABETES MELLITUS WITHOUT COMPLICATION, WITHOUT LONG-TERM CURRENT USE OF INSULIN (HCC): ICD-10-CM

## 2023-10-06 RX ORDER — LOSARTAN POTASSIUM 50 MG/1
50 TABLET ORAL DAILY
Qty: 90 TABLET | Refills: 1 | Status: SHIPPED | OUTPATIENT
Start: 2023-10-06 | End: 2024-09-30

## 2023-10-19 ENCOUNTER — PATIENT MESSAGE (OUTPATIENT)
Dept: FAMILY MEDICINE CLINIC | Facility: CLINIC | Age: 73
End: 2023-10-19

## 2023-10-19 NOTE — TELEPHONE ENCOUNTER
From: Aniket Negrete  To: Tanmay Bustamante  Sent: 10/19/2023 12:45 PM CDT  Subject: Constipation     Hello,    I haven't had a proper bowel movement in about 5 days or more. .just a bit here and there. I have started taking a tbs of Metamucil (2nd day) and also an overnight dulcolax to help to no avail. Is this normal with the medications I'm on? Please advise. Thank you.

## 2023-12-04 ENCOUNTER — TELEPHONE (OUTPATIENT)
Dept: FAMILY MEDICINE CLINIC | Facility: CLINIC | Age: 73
End: 2023-12-04

## 2023-12-04 NOTE — TELEPHONE ENCOUNTER
Pt daughter asking what kind of cough medicine her mother can take. Wants to make sure it doesn't interact with current meds. Please advise daughter.

## 2023-12-10 DIAGNOSIS — E11.9 TYPE 2 DIABETES MELLITUS WITHOUT COMPLICATION, WITHOUT LONG-TERM CURRENT USE OF INSULIN (HCC): ICD-10-CM

## 2023-12-13 RX ORDER — METFORMIN HYDROCHLORIDE 500 MG/1
500 TABLET, EXTENDED RELEASE ORAL 2 TIMES DAILY WITH MEALS
Qty: 180 TABLET | Refills: 0 | Status: SHIPPED | OUTPATIENT
Start: 2023-12-13 | End: 2024-03-12

## 2023-12-23 LAB
ABSOLUTE BASOPHILS: 73 CELLS/UL (ref 0–200)
ABSOLUTE EOSINOPHILS: 511 CELLS/UL (ref 15–500)
ABSOLUTE LYMPHOCYTES: 1489 CELLS/UL (ref 850–3900)
ABSOLUTE MONOCYTES: 511 CELLS/UL (ref 200–950)
ABSOLUTE NEUTROPHILS: 4716 CELLS/UL (ref 1500–7800)
ALBUMIN/GLOBULIN RATIO: 1.5 (CALC) (ref 1–2.5)
ALBUMIN: 4 G/DL (ref 3.6–5.1)
ALKALINE PHOSPHATASE: 59 U/L (ref 37–153)
ALT: 8 U/L (ref 6–29)
AST: 9 U/L (ref 10–35)
BASOPHILS: 1 %
BILIRUBIN, TOTAL: 0.5 MG/DL (ref 0.2–1.2)
BUN: 17 MG/DL (ref 7–25)
CALCIUM: 9.8 MG/DL (ref 8.6–10.4)
CARBON DIOXIDE: 27 MMOL/L (ref 20–32)
CHLORIDE: 106 MMOL/L (ref 98–110)
CHOL/HDLC RATIO: 3.2 (CALC)
CHOLESTEROL, TOTAL: 139 MG/DL
CREATININE, RANDOM URINE: 76 MG/DL (ref 20–275)
CREATININE: 0.88 MG/DL (ref 0.6–1)
EGFR: 69 ML/MIN/1.73M2
EOSINOPHILS: 7 %
GLOBULIN: 2.7 G/DL (CALC) (ref 1.9–3.7)
GLUCOSE: 104 MG/DL (ref 65–99)
HDL CHOLESTEROL: 44 MG/DL
HEMATOCRIT: 35.6 % (ref 35–45)
HEMOGLOBIN A1C: 6.3 % OF TOTAL HGB
HEMOGLOBIN: 11.1 G/DL (ref 11.7–15.5)
LDL-CHOLESTEROL: 77 MG/DL (CALC)
LYMPHOCYTES: 20.4 %
MCH: 26.6 PG (ref 27–33)
MCHC: 31.2 G/DL (ref 32–36)
MCV: 85.4 FL (ref 80–100)
MICROALBUMIN/CREATININE RATIO, RANDOM URINE: 4 MCG/MG CREAT
MICROALBUMIN: 0.3 MG/DL
MONOCYTES: 7 %
MPV: 10.2 FL (ref 7.5–12.5)
NEUTROPHILS: 64.6 %
NON-HDL CHOLESTEROL: 95 MG/DL (CALC)
PLATELET COUNT: 392 THOUSAND/UL (ref 140–400)
POTASSIUM: 4.5 MMOL/L (ref 3.5–5.3)
PROTEIN, TOTAL: 6.7 G/DL (ref 6.1–8.1)
RDW: 13.3 % (ref 11–15)
RED BLOOD CELL COUNT: 4.17 MILLION/UL (ref 3.8–5.1)
SODIUM: 142 MMOL/L (ref 135–146)
TRIGLYCERIDES: 95 MG/DL
WHITE BLOOD CELL COUNT: 7.3 THOUSAND/UL (ref 3.8–10.8)

## 2023-12-29 ENCOUNTER — TELEPHONE (OUTPATIENT)
Dept: FAMILY MEDICINE CLINIC | Facility: CLINIC | Age: 73
End: 2023-12-29

## 2023-12-29 NOTE — TELEPHONE ENCOUNTER
Daughter calling, patient with cyst/boil around inner right thigh area. Cyst/boil red with pus plug, daughter was applying  ointments, peroxide. Has had cyst/boil for 5 days now.

## 2024-01-03 DIAGNOSIS — I10 PRIMARY HYPERTENSION: ICD-10-CM

## 2024-01-03 DIAGNOSIS — E11.9 TYPE 2 DIABETES MELLITUS WITHOUT COMPLICATION, WITHOUT LONG-TERM CURRENT USE OF INSULIN (HCC): ICD-10-CM

## 2024-01-04 RX ORDER — LOSARTAN POTASSIUM 50 MG/1
50 TABLET ORAL DAILY
Qty: 90 TABLET | Refills: 1 | Status: SHIPPED | OUTPATIENT
Start: 2024-01-04 | End: 2024-12-29

## 2024-01-05 ENCOUNTER — HOSPITAL ENCOUNTER (OUTPATIENT)
Dept: MAMMOGRAPHY | Facility: HOSPITAL | Age: 74
Discharge: HOME | End: 2024-01-05
Attending: FAMILY MEDICINE
Payer: MEDICARE

## 2024-01-05 DIAGNOSIS — N64.9 BREAST LESION: Primary | ICD-10-CM

## 2024-01-05 DIAGNOSIS — N60.01 CYST (SOLITARY) OF BREAST, RIGHT: ICD-10-CM

## 2024-01-05 PROCEDURE — 76642 ULTRASOUND BREAST LIMITED: CPT | Performed by: FAMILY MEDICINE

## 2024-01-10 DIAGNOSIS — E11.9 TYPE 2 DIABETES MELLITUS WITHOUT COMPLICATION, WITHOUT LONG-TERM CURRENT USE OF INSULIN (HCC): ICD-10-CM

## 2024-01-11 RX ORDER — ATORVASTATIN CALCIUM 10 MG/1
10 TABLET, FILM COATED ORAL DAILY
Qty: 90 TABLET | Refills: 3 | Status: SHIPPED | OUTPATIENT
Start: 2024-01-11

## 2024-02-14 DIAGNOSIS — I10 PRIMARY HYPERTENSION: ICD-10-CM

## 2024-02-14 DIAGNOSIS — E11.9 TYPE 2 DIABETES MELLITUS WITHOUT COMPLICATION, WITHOUT LONG-TERM CURRENT USE OF INSULIN (HCC): ICD-10-CM

## 2024-02-14 RX ORDER — LOSARTAN POTASSIUM 50 MG/1
50 TABLET ORAL DAILY
Qty: 90 TABLET | Refills: 1 | Status: SHIPPED | OUTPATIENT
Start: 2024-02-14 | End: 2025-02-08

## 2024-02-15 RX ORDER — METFORMIN HYDROCHLORIDE 500 MG/1
500 TABLET, EXTENDED RELEASE ORAL 2 TIMES DAILY WITH MEALS
Qty: 180 TABLET | Refills: 0 | Status: SHIPPED | OUTPATIENT
Start: 2024-02-15 | End: 2024-05-15

## 2024-02-15 RX ORDER — ATORVASTATIN CALCIUM 10 MG/1
10 TABLET, FILM COATED ORAL DAILY
Qty: 90 TABLET | Refills: 3 | Status: SHIPPED | OUTPATIENT
Start: 2024-02-15

## 2024-02-15 NOTE — TELEPHONE ENCOUNTER
Requested Prescriptions     Pending Prescriptions Disp Refills    metFORMIN  MG Oral Tablet 24 Hr 180 tablet 0     Sig: Take 1 tablet (500 mg total) by mouth 2 (two) times daily with meals.    atorvastatin 10 MG Oral Tab 90 tablet 3     Sig: Take 1 tablet (10 mg total) by mouth daily.       LOV: 9/19/23  RTC: 6 month   Last Relevant Labs: 12/22/23  Filled: [id number] #180 with 0 refills    No future appointments.

## 2024-03-11 ENCOUNTER — TELEPHONE (OUTPATIENT)
Dept: FAMILY MEDICINE CLINIC | Facility: CLINIC | Age: 74
End: 2024-03-11

## 2024-03-11 NOTE — TELEPHONE ENCOUNTER
Future Appointments   Date Time Provider Department Center   4/2/2024 11:00 AM Acosta Pinto DO [facility] [address] [facility] [address]     Daughter scheduled follow up visit, asking if needs lab orders for upcoming visit. Please advise

## 2024-03-12 ENCOUNTER — TELEPHONE (OUTPATIENT)
Dept: FAMILY MEDICINE CLINIC | Facility: CLINIC | Age: 74
End: 2024-03-12

## 2024-03-24 DIAGNOSIS — E11.9 TYPE 2 DIABETES MELLITUS WITHOUT COMPLICATION, WITHOUT LONG-TERM CURRENT USE OF INSULIN (HCC): ICD-10-CM

## 2024-03-24 DIAGNOSIS — I10 PRIMARY HYPERTENSION: ICD-10-CM

## 2024-03-25 RX ORDER — LOSARTAN POTASSIUM 50 MG/1
50 TABLET ORAL DAILY
Qty: 90 TABLET | Refills: 1 | OUTPATIENT
Start: 2024-03-25 | End: 2025-03-20

## 2024-03-25 NOTE — TELEPHONE ENCOUNTER
Requesting Losartan 50mg  LOV: 9/19/23  RTC: 6 months  Last Relevant Labs: 12/22/23  Filled: 2/14/24 #90 with 1 refills    Future Appointments   Date Time Provider Department Center   4/2/2024 11:00 AM Acosta Pinto DO [facility] [address] [facility] [address]     Rx sent in February for 6 months  
(4) completely dependent

## 2024-04-02 ENCOUNTER — OFFICE VISIT (OUTPATIENT)
Dept: FAMILY MEDICINE CLINIC | Facility: CLINIC | Age: 74
End: 2024-04-02
Payer: MEDICARE

## 2024-04-02 VITALS
RESPIRATION RATE: 16 BRPM | SYSTOLIC BLOOD PRESSURE: 140 MMHG | DIASTOLIC BLOOD PRESSURE: 72 MMHG | HEIGHT: 67 IN | BODY MASS INDEX: 28.72 KG/M2 | HEART RATE: 76 BPM | TEMPERATURE: 98 F | WEIGHT: 183 LBS | OXYGEN SATURATION: 97 %

## 2024-04-02 DIAGNOSIS — E11.3593 TYPE 2 DIABETES MELLITUS WITH PROLIFERATIVE RETINOPATHY OF BOTH EYES, WITHOUT LONG-TERM CURRENT USE OF INSULIN, MACULAR EDEMA PRESENCE UNSPECIFIED, UNSPECIFIED PROLIFERATIVE RETINOPATHY TYPE (HCC): Primary | ICD-10-CM

## 2024-04-02 DIAGNOSIS — I10 PRIMARY HYPERTENSION: ICD-10-CM

## 2024-04-02 DIAGNOSIS — D64.9 LOW HEMOGLOBIN: ICD-10-CM

## 2024-04-02 PROCEDURE — 3078F DIAST BP <80 MM HG: CPT | Performed by: FAMILY MEDICINE

## 2024-04-02 PROCEDURE — 3008F BODY MASS INDEX DOCD: CPT | Performed by: FAMILY MEDICINE

## 2024-04-02 PROCEDURE — 3077F SYST BP >= 140 MM HG: CPT | Performed by: FAMILY MEDICINE

## 2024-04-02 PROCEDURE — 99214 OFFICE O/P EST MOD 30 MIN: CPT | Performed by: FAMILY MEDICINE

## 2024-04-02 PROCEDURE — 1159F MED LIST DOCD IN RCRD: CPT | Performed by: FAMILY MEDICINE

## 2024-04-02 PROCEDURE — 1160F RVW MEDS BY RX/DR IN RCRD: CPT | Performed by: FAMILY MEDICINE

## 2024-04-02 RX ORDER — METFORMIN HYDROCHLORIDE 500 MG/1
500 TABLET, EXTENDED RELEASE ORAL 2 TIMES DAILY WITH MEALS
Qty: 180 TABLET | Refills: 0 | Status: SHIPPED | OUTPATIENT
Start: 2024-04-02 | End: 2024-07-01

## 2024-04-02 NOTE — PROGRESS NOTES
HPI:   Subjective   Chief Complaint   Patient presents with    Diabetes      Ines Riley is a 73 year old female who presents for follow-up of elevated blood pressure. She is taking losartan 50mg daily.   She is active and is not adherent to a low-salt diet.  . Patient denies: chest pain, dyspnea, exertional chest pressure/discomfort, orthopnea, and paroxysmal nocturnal dyspnea. Use of agents associated with hypertension: none.       Diabetes  She presents for her diabetic visit. She has type 2 diabetes mellitus.   Last A1c value was 6.3% done 12/22/2023.  Pertinent negatives for hypoglycemia include no confusion. Pertinent negatives for diabetes include no blurred vision, no chest pain, no fatigue, no foot paresthesias, no foot ulcerations, no polydipsia, no polyphagia, no visual change and no weakness. She does have diabetic retinopathy in both eyes- sees Dr. Oppenheim. Pertinent negatives for diabetic complications include no heart disease or peripheral neuropathy.  she is on metformin 500 ER mg bid. UTD on eye exam.     She is taking the atorvastatin. No SE.    Last CBC showed hgb of 11 - repeat cbc and iron panel ordered.     Last A1c value was 6.3% done 12/22/2023.    Wt Readings from Last 6 Encounters:   04/02/24 183 lb (83 kg)   09/19/23 202 lb (91.6 kg)   09/05/23 206 lb (93.4 kg)   08/26/23 211 lb 10.3 oz (96 kg)   07/13/23 212 lb 3.2 oz (96.3 kg)   07/10/23 220 lb (99.8 kg)         Hypertension Labs   Lab Results   Component Value Date    CREATSERUM 0.88 12/22/2023    K 4.5 12/22/2023    CHOLEST 139 12/22/2023    CHOLEST 218 (H) 06/15/2022    HDL 44 (L) 12/22/2023    HDL 45 (L) 06/15/2022    LDL 77 12/22/2023     (H) 06/15/2022    TRIG 95 12/22/2023    TRIG 135 06/15/2022        Wt Readings from Last 3 Encounters:   04/02/24 183 lb (83 kg)   09/19/23 202 lb (91.6 kg)   09/05/23 206 lb (93.4 kg)     BP Readings from Last 3 Encounters:   09/19/23 122/82   09/05/23 140/80   08/26/23 141/87           Past History:   The following portions of the patient's history were reviewed in this encounter and updated as appropriate:  Chief Complaint Reviewed and Verified  No Further Nursing Notes to   Review  Tobacco Reviewed  Medical History Reviewed  Surgical History   Reviewed  Family History Reviewed  Social History Reviewed          She  has no past medical history on file.   Her family history includes Dementia in her mother; Diabetes in her daughter and mother.   She  reports that she has never smoked. She has never used smokeless tobacco. She reports that she does not drink alcohol and does not use drugs.     She has No Known Allergies.     Current Outpatient Medications on File Prior to Visit   Medication Sig    metFORMIN  MG Oral Tablet 24 Hr Take 1 tablet (500 mg total) by mouth 2 (two) times daily with meals. Needs an appointment for further refills    atorvastatin 10 MG Oral Tab Take 1 tablet (10 mg total) by mouth daily.    losartan 50 MG Oral Tab Take 1 tablet (50 mg total) by mouth daily.    aspirin 81 MG Oral Chew Tab Chew by mouth daily.    Blood Glucose Monitoring Suppl (ONETOUCH VERIO FLEX SYSTEM) w/Device Does not apply Kit Check daily    Glucose Blood (ONETOUCH VERIO) In Vitro Strip Check daily in morning    OneTouch Delica Lancets Fine Does not apply Misc Check daily    Black Elderberry (SAMBUCUS ELDERBERRY OR)     ergocalciferol 8000units/mL Oral Solution Take by mouth As Directed.    Multiple Vitamins-Minerals (MULTIVITAMIN ADULT, MINERALS, OR) Take by mouth.     No current facility-administered medications on file prior to visit.     CBC  (most recent labs)   Lab Results   Component Value Date/Time    WBC 7.3 12/22/2023 09:07 AM    HGB 11.1 (L) 12/22/2023 09:07 AM    HCT 35.6 12/22/2023 09:07 AM     12/22/2023 09:07 AM         CMP  (most recent labs)   Lab Results   Component Value Date/Time     (H) 12/22/2023 09:07 AM    BUN 17 12/22/2023 09:07 AM    CREATSERUM  0.88 12/22/2023 09:07 AM    GFRNAA 64 06/15/2022 06:38 AM    GFRAA 74 06/15/2022 06:38 AM    EGFRCR 69 12/22/2023 09:07 AM    CA 9.8 12/22/2023 09:07 AM    ALKPHO 59 12/22/2023 09:07 AM    AST 9 (L) 12/22/2023 09:07 AM    ALT 8 12/22/2023 09:07 AM    BILT 0.5 12/22/2023 09:07 AM    TP 6.7 12/22/2023 09:07 AM    ALB 4.0 12/22/2023 09:07 AM     12/22/2023 09:07 AM    K 4.5 12/22/2023 09:07 AM     12/22/2023 09:07 AM    CO2 27 12/22/2023 09:07 AM           Lipids  (most recent labs)   Lab Results   Component Value Date/Time    CHOLEST 139 12/22/2023 09:07 AM    TRIG 95 12/22/2023 09:07 AM    HDL 44 (L) 12/22/2023 09:07 AM    LDL 77 12/22/2023 09:07 AM    NONHDLC 95 12/22/2023 09:07 AM           REVIEW OF SYSTEMS:   GENERAL HEALTH: feels well otherwise  Pertinent items are noted in HPI.      EXAM:   Wt 183 lb (83 kg)   BMI 28.66 kg/m²  Estimated body mass index is 28.66 kg/m² as calculated from the following:    Height as of 9/19/23: 5' 7\" (1.702 m).    Weight as of this encounter: 183 lb (83 kg).   GENERAL: well developed, well nourished,in no apparent distress  SKIN: no rashes,no suspicious lesions  NECK: supple,no adenopathy,no bruits  LUNGS: clear to auscultation  CARDIO: RRR without murmur  GI: good BS's,no masses, HSM or tenderness  EXTREMITIES: no cyanosis, clubbing or edema  NEURO: A&O to person place and time.       ASSESSMENT/PLAN:      1. Type 2 diabetes mellitus without complication, without long-term current use of insulin (HCC)  Last A1c value was 6.3% done 12/22/2023.    Recommendations are: continue present meds-metformin 500mg bid- may reduce the dose after checking labs in June; continue with dietary compliance and exercise, see ophthalmology per instructions, check feet daily, and check labs June 2. Primary hypertension  Conitnue on losartan  Last K was 4.5 done on 12/22/2023.  Last Cr was 0.88 done on 12/22/2023.  Hypertension meds include losartan 50 MG Oral Tab [[id number]].    -  losartan 50 MG Oral Tab; Take 1 tablet (50 mg total) by mouth daily.  Dispense: 90 tablet; Refill: 3      3. Low hemoglobin  Colonoscopy in 2023- hemorrhoids otherwise normal.   - CBC With Differential With Platelet  - Iron And Tibc [E]  - Ferritin [E]      Continue with current treatment plan.  No follow-ups on file.    25 minutes were spent face to face with the patient in which time was spent  preparing to see patient (including chart review and preparation), obtaining and or reviewing additional medical history, performing a physical exam and evaluation, documenting clinical information in the electronic health record, independently interpreting results, communicating results to family or caregiver, and/or coordinating care.      Acosta Pinto,

## 2024-04-03 LAB
% SATURATION: 21 % (CALC) (ref 16–45)
ABSOLUTE BASOPHILS: 53 CELLS/UL (ref 0–200)
ABSOLUTE EOSINOPHILS: 366 CELLS/UL (ref 15–500)
ABSOLUTE LYMPHOCYTES: 2017.8 CELLS/UL (ref 850–3900)
ABSOLUTE MONOCYTES: 437 CELLS/UL (ref 200–950)
ABSOLUTE NEUTROPHILS: 3027 CELLS/UL (ref 1500–7800)
ALBUMIN/GLOBULIN RATIO: 1.5 (CALC) (ref 1–2.5)
ALBUMIN: 4 G/DL (ref 3.6–5.1)
ALKALINE PHOSPHATASE: 46 U/L (ref 37–153)
ALT: 8 U/L (ref 6–29)
AST: 11 U/L (ref 10–35)
BASOPHILS: 0.9 %
BILIRUBIN, TOTAL: 0.3 MG/DL (ref 0.2–1.2)
BUN/CREATININE RATIO: 27 (CALC) (ref 6–22)
BUN: 26 MG/DL (ref 7–25)
CALCIUM: 9.5 MG/DL (ref 8.6–10.4)
CARBON DIOXIDE: 25 MMOL/L (ref 20–32)
CHLORIDE: 107 MMOL/L (ref 98–110)
CREATININE, RANDOM URINE: 87 MG/DL (ref 20–275)
CREATININE: 0.97 MG/DL (ref 0.6–1)
EGFR: 61 ML/MIN/1.73M2
EOSINOPHILS: 6.2 %
FERRITIN: 110 NG/ML (ref 16–288)
GLOBULIN: 2.6 G/DL (CALC) (ref 1.9–3.7)
GLUCOSE: 83 MG/DL (ref 65–139)
HEMATOCRIT: 35.1 % (ref 35–45)
HEMOGLOBIN A1C: 6 % OF TOTAL HGB
HEMOGLOBIN: 10.9 G/DL (ref 11.7–15.5)
IRON BINDING CAPACITY: 257 MCG/DL (CALC) (ref 250–450)
IRON, TOTAL: 55 MCG/DL (ref 45–160)
LYMPHOCYTES: 34.2 %
MCH: 26.7 PG (ref 27–33)
MCHC: 31.1 G/DL (ref 32–36)
MCV: 86 FL (ref 80–100)
MICROALBUMIN/CREATININE RATIO, RANDOM URINE: 3 MG/G CREAT
MICROALBUMIN: 0.3 MG/DL
MONOCYTES: 7.4 %
MPV: 10.6 FL (ref 7.5–12.5)
NEUTROPHILS: 51.3 %
PLATELET COUNT: 304 THOUSAND/UL (ref 140–400)
POTASSIUM: 4.5 MMOL/L (ref 3.5–5.3)
PROTEIN, TOTAL: 6.6 G/DL (ref 6.1–8.1)
RDW: 13.9 % (ref 11–15)
RED BLOOD CELL COUNT: 4.08 MILLION/UL (ref 3.8–5.1)
SODIUM: 141 MMOL/L (ref 135–146)
WHITE BLOOD CELL COUNT: 5.9 THOUSAND/UL (ref 3.8–10.8)

## 2024-04-07 PROBLEM — N18.30 CKD (CHRONIC KIDNEY DISEASE) STAGE 3, GFR 30-59 ML/MIN (HCC): Chronic | Status: RESOLVED | Noted: 2023-09-19 | Resolved: 2024-04-07

## 2024-04-08 DIAGNOSIS — D64.9 LOW HEMOGLOBIN: Primary | ICD-10-CM

## 2024-05-14 DIAGNOSIS — E11.9 TYPE 2 DIABETES MELLITUS WITHOUT COMPLICATION, WITHOUT LONG-TERM CURRENT USE OF INSULIN (HCC): ICD-10-CM

## 2024-05-15 DIAGNOSIS — E11.3593 TYPE 2 DIABETES MELLITUS WITH PROLIFERATIVE RETINOPATHY OF BOTH EYES, WITHOUT LONG-TERM CURRENT USE OF INSULIN, MACULAR EDEMA PRESENCE UNSPECIFIED, UNSPECIFIED PROLIFERATIVE RETINOPATHY TYPE (HCC): ICD-10-CM

## 2024-05-16 RX ORDER — ATORVASTATIN CALCIUM 10 MG/1
10 TABLET, FILM COATED ORAL DAILY
Qty: 90 TABLET | Refills: 3 | OUTPATIENT
Start: 2024-05-16

## 2024-05-16 RX ORDER — METFORMIN HYDROCHLORIDE 500 MG/1
500 TABLET, EXTENDED RELEASE ORAL 2 TIMES DAILY WITH MEALS
Qty: 180 TABLET | Refills: 0 | Status: SHIPPED | OUTPATIENT
Start: 2024-05-16

## 2024-05-16 NOTE — TELEPHONE ENCOUNTER
Requested Prescriptions     Pending Prescriptions Disp Refills    METFORMIN  MG Oral Tablet 24 Hr [Pharmacy Med Name: METFORMIN ER 500MG 24HR TABS] 180 tablet 0     Sig: TAKE 1 TABLET(500 MG) BY MOUTH TWICE DAILY WITH MEALS       LOV: 4/2/24  RTC:   Last Relevant Labs: 4/2/24  Filled: 4/2/24 180 with 0 refills    Future Appointments   Date Time Provider Department Center   7/12/2024 11:30 AM Acosta Pinto DO [facility] [address] [facility] [address]

## 2024-05-16 NOTE — TELEPHONE ENCOUNTER
Requested Prescriptions     Pending Prescriptions Disp Refills    atorvastatin 10 MG Oral Tab 90 tablet 3     Sig: Take 1 tablet (10 mg total) by mouth daily.       LOV: 4/2/24  RTC:   Last Relevant Labs: 4/2/24  Filled: 2/15/24 #90 with 3 refills    Future Appointments   Date Time Provider Department Center   7/12/2024 11:30 AM Acosta Pinto DO [facility] [address] [facility] [address]

## 2024-06-09 DIAGNOSIS — E11.9 TYPE 2 DIABETES MELLITUS WITHOUT COMPLICATION, WITHOUT LONG-TERM CURRENT USE OF INSULIN (HCC): ICD-10-CM

## 2024-06-09 DIAGNOSIS — I10 PRIMARY HYPERTENSION: ICD-10-CM

## 2024-06-12 RX ORDER — LOSARTAN POTASSIUM 50 MG/1
50 TABLET ORAL DAILY
Qty: 90 TABLET | Refills: 1 | Status: SHIPPED | OUTPATIENT
Start: 2024-06-12

## 2024-06-12 NOTE — TELEPHONE ENCOUNTER
Requesting Losartan 50mg  LOV: 4/2/24  RTC: 6 months  Last Relevant Labs: 4/2/24  Filled: 2/14/24 #90 with 0 refills    Future Appointments   Date Time Provider Department Center   7/12/2024 11:30 AM Acosta Pinto DO [facility] [address] [facility] [address]     Hypertension Medications Protocol [id number]/09/2024 04:11 PM   Protocol Details Last BP reading less than 140/90    CMP or BMP in past 12 months    In person appointment or virtual visit in the past 12 mos or appointment in next 3 mos    EGFRCR or GFRAA > 50     Per OV notes 4/2/24, continue Losartan 50mg    Rx sent

## 2024-07-12 ENCOUNTER — OFFICE VISIT (OUTPATIENT)
Dept: FAMILY MEDICINE CLINIC | Facility: CLINIC | Age: 74
End: 2024-07-12
Payer: MEDICARE

## 2024-07-12 DIAGNOSIS — N18.31 STAGE 3A CHRONIC KIDNEY DISEASE (HCC): ICD-10-CM

## 2024-07-12 DIAGNOSIS — M85.852 OSTEOPENIA OF BOTH HIPS: ICD-10-CM

## 2024-07-12 DIAGNOSIS — N60.01 CYST OF RIGHT BREAST: ICD-10-CM

## 2024-07-12 DIAGNOSIS — H90.0 CONDUCTIVE HEARING LOSS, BILATERAL: ICD-10-CM

## 2024-07-12 DIAGNOSIS — Z00.00 ENCOUNTER FOR ANNUAL HEALTH EXAMINATION: Primary | ICD-10-CM

## 2024-07-12 DIAGNOSIS — E11.3593 TYPE 2 DIABETES MELLITUS WITH PROLIFERATIVE RETINOPATHY OF BOTH EYES, WITHOUT LONG-TERM CURRENT USE OF INSULIN, MACULAR EDEMA PRESENCE UNSPECIFIED, UNSPECIFIED PROLIFERATIVE RETINOPATHY TYPE (HCC): ICD-10-CM

## 2024-07-12 DIAGNOSIS — Z78.0 OSTEOPENIA AFTER MENOPAUSE: ICD-10-CM

## 2024-07-12 DIAGNOSIS — D64.9 LOW HEMOGLOBIN: ICD-10-CM

## 2024-07-12 DIAGNOSIS — Z12.31 BREAST CANCER SCREENING BY MAMMOGRAM: ICD-10-CM

## 2024-07-12 DIAGNOSIS — M85.851 OSTEOPENIA OF BOTH HIPS: ICD-10-CM

## 2024-07-12 DIAGNOSIS — I10 PRIMARY HYPERTENSION: ICD-10-CM

## 2024-07-12 DIAGNOSIS — M85.80 OSTEOPENIA AFTER MENOPAUSE: ICD-10-CM

## 2024-07-12 DIAGNOSIS — R92.8 OTHER ABNORMAL AND INCONCLUSIVE FINDINGS ON DIAGNOSTIC IMAGING OF BREAST: ICD-10-CM

## 2024-07-12 DIAGNOSIS — Z00.00 ENCOUNTER FOR MEDICARE ANNUAL WELLNESS EXAM: ICD-10-CM

## 2024-07-12 PROBLEM — E11.9 TYPE 2 DIABETES MELLITUS WITHOUT COMPLICATION, WITHOUT LONG-TERM CURRENT USE OF INSULIN (HCC): Status: ACTIVE | Noted: 2024-07-12

## 2024-07-12 PROCEDURE — 1126F AMNT PAIN NOTED NONE PRSNT: CPT | Performed by: FAMILY MEDICINE

## 2024-07-12 PROCEDURE — 1170F FXNL STATUS ASSESSED: CPT | Performed by: FAMILY MEDICINE

## 2024-07-12 PROCEDURE — G0439 PPPS, SUBSEQ VISIT: HCPCS | Performed by: FAMILY MEDICINE

## 2024-07-12 PROCEDURE — 1159F MED LIST DOCD IN RCRD: CPT | Performed by: FAMILY MEDICINE

## 2024-07-12 PROCEDURE — 3044F HG A1C LEVEL LT 7.0%: CPT | Performed by: FAMILY MEDICINE

## 2024-07-12 PROCEDURE — 3061F NEG MICROALBUMINURIA REV: CPT | Performed by: FAMILY MEDICINE

## 2024-07-12 PROCEDURE — 1160F RVW MEDS BY RX/DR IN RCRD: CPT | Performed by: FAMILY MEDICINE

## 2024-07-12 PROCEDURE — 96160 PT-FOCUSED HLTH RISK ASSMT: CPT | Performed by: FAMILY MEDICINE

## 2024-07-12 PROCEDURE — 99499 UNLISTED E&M SERVICE: CPT | Performed by: FAMILY MEDICINE

## 2024-07-12 RX ORDER — METFORMIN HYDROCHLORIDE 500 MG/1
500 TABLET, EXTENDED RELEASE ORAL 2 TIMES DAILY WITH MEALS
Qty: 180 TABLET | Refills: 1 | Status: SHIPPED | OUTPATIENT
Start: 2024-07-12

## 2024-07-12 NOTE — PROGRESS NOTES
Subjective:   Ines Riley is a 73 year old female who presents for a MA (Medicare Advantage) Supervisit (Once per calendar year).     Patient has history of bilateral hearing loss and is using hearing aid She sees audiologist.      Last hgb was 10.9- they did not see hematology as advised.   She takes otc iron supplement. Denies sob, fatigue, chest pain, headache, palpitations. Denies blood in stool. She has been gradually losing weight. She is walking regularly now and following a diabetic diet. She is on metformin for DM as well. Denies abdominal pain. She reports appetite is per normal. Colonoscopy in 2023- unremarkable.     Pt due for mammogram- diagnostic and right breast US. Patient instructed to schedule this. Daughter will help with this. .     T2DM w/ b/l retinopathy-Last A1c value was 6% done 4/2/2024.  Eye exam utd.   Due for labs in October.   Compliant w/ diet/exercise.      Will get pna vaccine in the Fall   Shingles- will get  at pharmacy      Last A1c value was 6% done 4/2/2024.         Wt Readings from Last 6 Encounters:   07/12/24 (P) 178 lb 5.6 oz (80.9 kg)   04/02/24 183 lb (83 kg)   09/19/23 202 lb (91.6 kg)   09/05/23 206 lb (93.4 kg)   08/26/23 211 lb 10.3 oz (96 kg)   07/13/23 212 lb 3.2 oz (96.3 kg)         History/Other:   Fall Risk Assessment:   She has been screened for Falls and is low risk.      Cognitive Assessment:   She had a completely normal cognitive assessment - see flowsheet entries     Functional Ability/Status:   Ines Riley has some abnormal functions as listed below:  She has Hearing problems based on screening of functional status.She has Vision problems based on screening of functional status.       Depression Screening (PHQ-2/PHQ-9): PHQ-2 SCORE: 0  , done 7/6/2024   Last [place] Suicide Screening on 7/12/2024 was No Risk.     Advanced Directives:   She does NOT have a Living Will. [Do you have a living will?: No]  She does NOT have a Power of   for Health Care. [Do you have a healthcare power of ?: No]  Discussed Advance Care Planning with patient (and family/surrogate if present). Standard forms made available to patient in After Visit Summary.      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    Current Medications:  Outpatient Medications Marked as Taking for the 24 encounter (Office Visit) with Acosta Pinto DO   Medication Sig    losartan 50 MG Oral Tab Take 1 tablet (50 mg total) by mouth daily.    METFORMIN  MG Oral Tablet 24 Hr TAKE 1 TABLET(500 MG) BY MOUTH TWICE DAILY WITH MEALS    atorvastatin 10 MG Oral Tab Take 1 tablet (10 mg total) by mouth daily.    aspirin 81 MG Oral Chew Tab Chew by mouth daily.    Blood Glucose Monitoring Suppl (ONETOUCH VERIO FLEX SYSTEM) w/Device Does not apply Kit Check daily    Glucose Blood (ONETOUCH VERIO) In Vitro Strip Check daily in morning    OneTouch Delica Lancets Fine Does not apply Misc Check daily    ergocalciferol 8000units/mL Oral Solution Take by mouth As Directed.    Multiple Vitamins-Minerals (MULTIVITAMIN ADULT, MINERALS, OR) Take by mouth.       Medical History:  She  has no past medical history on file.  Surgical History:  She  has a past surgical history that includes colonoscopy;  (); hysterectomy; ; and colonoscopy (N/A, 2023).   Family History:  Her family history includes Dementia in her mother; Diabetes in her daughter and mother.  Social History:  She  reports that she has never smoked. She has never been exposed to tobacco smoke. She has never used smokeless tobacco. She reports that she does not drink alcohol and does not use drugs.    Tobacco:  She  has never smoked tobacco.    CAGE Alcohol Screen:   CAGE screening score of 0 on 7/6/2024, showing low risk of alcohol abuse.      Patient Care Team:  Acosta Pinto DO as PCP - General (Family Medicine)    Review of Systems   Constitutional:  Negative for fatigue.   Cardiovascular:  Negative for chest pain.   Endocrine: Negative for polydipsia and polyphagia.   Neurological:  Negative for dizziness and weakness.   Psychiatric/Behavioral:  Negative for confusion.      GENERAL: feels well otherwise  SKIN: denies any unusual skin lesions  EYES: denies blurred vision or double vision  HEENT: denies nasal congestion, sinus pain or ST  LUNGS: denies shortness of breath with exertion  CARDIOVASCULAR: denies chest pain on exertion  GI: denies abdominal pain, denies heartburn  : denies dysuria, vaginal discharge or itching, no complaint of urinary incontinence        Objective:   Physical Exam  General Appearance:  Alert, cooperative, no distress, appears stated age   Head:  Normocephalic, without obvious abnormality, atraumatic   Eyes:  PERRL, conjunctiva/corneas clear, EOM's intact both eyes   Ears:  Normal TM's and external ear canals, both ears   Nose: Nares normal, septum midline,mucosa normal, no drainage or sinus tenderness   Throat: Lips, mucosa, and tongue normal   Neck: Supple, symmetrical, trachea midline   Normal thyroid.    Back:   Symmetric,     Lungs:   Clear to auscultation bilaterally, respirations unlabored   Heart:  Regular rate and rhythm, S1 and S2 normal, no murmur, rub, or gallop   Abdomen:   Soft, non-tender, bowel sounds active all four quadrants,  no masses, no organomegaly        Extremities: Extremities normal, atraumatic, no cyanosis or edema                      Diabetes Composite Score: 4   Values used to calculate this score:    Points  Metrics       0        Blood Pressure: 140/72       1        Prescribed Statins: Yes       1        Hemoglobin A1c: 6.0 % of total Hgb       1         Smokes Tobacco: No       1        Prescribed Aspirin: Yes             BP (P) 124/84   Pulse (P) 74   Temp (P) 98 °F (36.7 °C) (Temporal)   Resp (P) 16   Ht (P) 5' 7\" (1.702 m)   Wt (P) 178 lb 5.6 oz (80.9 kg)   SpO2 (P) 98%   BMI (P) 27.93 kg/m²  Estimated body mass index is 27.93 kg/m² (pended) as calculated from the following:    Height as of this encounter: (P) 5' 7\" (1.702 m).    Weight as of this encounter: (P) 178 lb 5.6 oz (80.9 kg).    Medicare Hearing Assessment:   Hearing Screening    Time taken: 7/12/2024 11:27 AM  Entry User: Oma Connor MA  Screening Method: Whisper Test  Whisper Test Result: Pass               Assessment & Plan:   Ines Riley is a 73 year old female who presents for a Medicare Assessment.      The patient indicates understanding of these issues and agrees to the plan.  Reinforced healthy diet, lifestyle, and exercise.     1. Breast cancer screening by mammogram    2. Encounter for annual health examination  - -Discussed diet and exercise, counseled on vaccine and screening guidelines.     3. Osteopenia of both hips  Pt on calcium and vit d  Encouraged weight bearing exercise.     4. Primary hypertension  BP shows poor control with last BP of 140/72. Lifestyle changes, diet, exercise and weight loss were counseled. Medication changes as listed.   Last K was 4.5 done on 4/2/2024.  Last Cr was 0.97 done on 4/2/2024.  Last eGFR was 61 on 4/2/2024.  BP Meds: losartan Tabs - 50 MG       5. Low hemoglobin  Cbc ordered again.   Pt did not see hematology - referral was given.   She is taking oral iron supplement.   Utd CRC screening.   Asymptomatic.   - CBC With Differential With Platelet  - Iron And Tibc  - Ferritin    6. Conductive hearing loss, bilateral  Wearing hearing aids.     7. Stage 3a chronic kidney disease (HCC)  Stable.   Repeat labs in 3 months.     8. Cyst of right breast  - [facility] DEBRA 2D+3D DIAGNOSTIC [facility]  BILAT (CPT=77066/71175); Future  - US BREAST RIGHT  COMPLETE (CPT=76641); Future    9. Other abnormal and inconclusive findings on diagnostic imaging of breast  - [facility] DEBRA 2D+3D DIAGNOSTIC [facility]  BILAT (CPT=77066/04066); Future    10. Osteopenia after menopause    11. 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  A1c is 6 done 4/2/2024 which shows Excellent control. Continue current meds and lifestyle modification.  Diabetic medications include metFORMIN  MG Oral Tablet 24 Hr [[id number]].  Continue statin and losartan.   [facility] eye exam    12. Encounter for Medicare annual wellness exam            No follow-ups on file.     Acosta Pinto, DO     Supplementary Documentation:   General Health:  In the past six months, have you lost more than 10 pounds without trying?: 1 - Yes  Has your appetite been poor?: No  Type of Diet: Balanced;Diabetic;Low Salt;Low Carb  How does the patient maintain a good energy level?: Daily Walks;Other  How would you describe your daily physical activity?: Moderate  How would you describe your current health state?: Good  How do you maintain positive mental well-being?: Social Interaction;Games;Visiting Friends;Visiting Family  On a scale of 0 to 10, with 0 being no pain and 10 being severe pain, what is your pain level?: 0 - (None)  In the past six months, have you experienced urine leakage?: 0-No  At any time do you feel concerned for the safety/well-being of yourself and/or your children, in your home or elsewhere?: No  Have you had any immunizations at another office such as Influenza, Hepatitis B, Tetanus, or Pneumococcal?: No       Ines Riley's SCREENING SCHEDULE   Tests on this list are recommended by your physician but may not be covered, or covered at this frequency, by your insurer.   Please check with your insurance carrier before  scheduling to verify coverage.   PREVENTATIVE SERVICES FREQUENCY &  COVERAGE DETAILS LAST COMPLETION DATE   Diabetes Screening    Fasting Blood Sugar /  Glucose    One screening every 12 months if never tested or if previously tested but not diagnosed with pre-diabetes   One screening every 6 months if diagnosed with pre-diabetes Lab Results   Component Value Date    GLU 83 04/02/2024        Cardiovascular Disease Screening    Lipid Panel  Cholesterol  Lipoprotein (HDL)  Triglycerides Covered every 5 years for all Medicare beneficiaries without apparent signs or symptoms of cardiovascular disease Lab Results   Component Value Date    CHOLEST 139 12/22/2023    HDL 44 (L) 12/22/2023    LDL 77 12/22/2023    TRIG 95 12/22/2023         Electrocardiogram (EKG)   Covered if needed at Welcome to Medicare, and non-screening if indicated for medical reasons 08/27/2023      Ultrasound Screening for Abdominal Aortic Aneurysm (AAA) Covered once in a lifetime for one of the following risk factors    Men who are 65-75 years old and have ever smoked    Anyone with a family history -     Colorectal Cancer Screening  Covered for ages 50-85; only need ONE of the following:    Colonoscopy   Covered every 10 years    Covered every 2 years if patient is at high risk or previous colonoscopy was abnormal 01/18/2023    Health Maintenance   Topic Date Due    Colorectal Cancer Screening  Discontinued       Flexible Sigmoidoscopy   Covered every 4 years -    Fecal Occult Blood Test Covered annually -   Bone Density Screening    Bone density screening    Covered every 2 years after age 65 if diagnosed with risk of osteoporosis or estrogen deficiency.    Covered yearly for long-term glucocorticoid medication use (Steroids) Last Dexa Scan:    XR DEXA BONE DENSITOMETRY (CPT=77080) 05/17/2023      No recommendations at this time   Pap and Pelvic    Pap   Covered every 2 years for women at normal risk; Annually if at high risk -  No recommendations  at this time    Chlamydia Annually if high risk -  No recommendations at this time   Screening Mammogram    Mammogram     Recommend annually for all female patients aged 40 and older    One baseline mammogram covered for patients aged 35-39 05/17/2023    Health Maintenance   Topic Date Due    Mammogram  05/17/2024       Immunizations    Influenza Covered once per flu season  Please get every year -  No recommendations at this time    Pneumococcal Each vaccine ([id number] & [id number]) covered once after 65 Prevnar 13: -    [id number]: 03/21/2015     Pneumococcal Vaccination(2 of 2 - PCV) due on 03/21/2016    Hepatitis B One screening covered for patients with certain risk factors   -  No recommendations at this time    Tetanus Toxoid Not covered by Medicare Part B unless medically necessary (cut with metal); may be covered with your pharmacy prescription benefits -    Tetanus, Diptheria and Pertusis TD and TDaP Not covered by Medicare Part B -  No recommendations at this time    Zoster Not covered by Medicare Part B; may be covered with your pharmacy  prescription benefits -  Zoster Vaccines(1 of 2) Never done     Diabetes      Hemoglobin A1C Annually; if last result is elevated, may be asked to retest more frequently.    Medicare covers every 3 months Lab Results   Component Value Date    A1C 6.0 (H) 04/02/2024       No recommendations at this time    Creat/alb ratio Annually Lab Results   Component Value Date    MICROALBCREA 10.0 09/19/2023       LDL Annually Lab Results   Component Value Date    LDL 77 12/22/2023       Dilated Eye Exam Annually Last Diabetic Eye Exam:  Last Dilated Eye Exam: 03/11/24  Eye Exam shows Diabetic Retinopathy?: Yes       Annual Monitoring of Persistent Medications (ACE/ARB, digoxin diuretics, anticonvulsants)    Potassium Annually Lab Results   Component Value Date    K 4.5 04/02/2024         Creatinine   Annually Lab Results   Component Value Date    CREATSERUM 0.97 04/02/2024          BUN Annually Lab Results   Component Value Date    BUN 26 (H) 04/02/2024       Drug Serum Conc Annually No results found for: \"DIGOXIN\", \"DIG\", \"VALP\"

## 2024-07-17 LAB
% SATURATION: 21 % (CALC) (ref 16–45)
ABSOLUTE BASOPHILS: 42 CELLS/UL (ref 0–200)
ABSOLUTE EOSINOPHILS: 260 CELLS/UL (ref 15–500)
ABSOLUTE LYMPHOCYTES: 1706 CELLS/UL (ref 850–3900)
ABSOLUTE MONOCYTES: 411 CELLS/UL (ref 200–950)
ABSOLUTE NEUTROPHILS: 2782 CELLS/UL (ref 1500–7800)
ALBUMIN/GLOBULIN RATIO: 1.5 (CALC) (ref 1–2.5)
ALBUMIN: 4.1 G/DL (ref 3.6–5.1)
ALKALINE PHOSPHATASE: 50 U/L (ref 37–153)
ALT: 9 U/L (ref 6–29)
AST: 10 U/L (ref 10–35)
BASOPHILS: 0.8 %
BILIRUBIN, TOTAL: 0.5 MG/DL (ref 0.2–1.2)
BUN: 22 MG/DL (ref 7–25)
CALCIUM: 9.5 MG/DL (ref 8.6–10.4)
CARBON DIOXIDE: 28 MMOL/L (ref 20–32)
CHLORIDE: 107 MMOL/L (ref 98–110)
CHOL/HDLC RATIO: 2.7 (CALC)
CHOLESTEROL, TOTAL: 146 MG/DL
CREATININE, RANDOM URINE: 102 MG/DL (ref 20–275)
CREATININE: 0.97 MG/DL (ref 0.6–1)
EGFR: 61 ML/MIN/1.73M2
EOSINOPHILS: 5 %
FERRITIN: 107 NG/ML (ref 16–288)
GLOBULIN: 2.7 G/DL (CALC) (ref 1.9–3.7)
GLUCOSE: 105 MG/DL (ref 65–99)
HDL CHOLESTEROL: 54 MG/DL
HEMATOCRIT: 33.9 % (ref 35–45)
HEMOGLOBIN A1C: 6.2 % OF TOTAL HGB
HEMOGLOBIN: 10.7 G/DL (ref 11.7–15.5)
IRON BINDING CAPACITY: 280 MCG/DL (CALC) (ref 250–450)
IRON, TOTAL: 60 MCG/DL (ref 45–160)
LDL-CHOLESTEROL: 78 MG/DL (CALC)
LYMPHOCYTES: 32.8 %
MCH: 27.3 PG (ref 27–33)
MCHC: 31.6 G/DL (ref 32–36)
MCV: 86.5 FL (ref 80–100)
MICROALBUMIN/CREATININE RATIO, RANDOM URINE: 3 MG/G CREAT
MICROALBUMIN: 0.3 MG/DL
MONOCYTES: 7.9 %
MPV: 10 FL (ref 7.5–12.5)
NEUTROPHILS: 53.5 %
NON-HDL CHOLESTEROL: 92 MG/DL (CALC)
PLATELET COUNT: 314 THOUSAND/UL (ref 140–400)
POTASSIUM: 5 MMOL/L (ref 3.5–5.3)
PROTEIN, TOTAL: 6.8 G/DL (ref 6.1–8.1)
RDW: 13.3 % (ref 11–15)
RED BLOOD CELL COUNT: 3.92 MILLION/UL (ref 3.8–5.1)
SODIUM: 142 MMOL/L (ref 135–146)
TRIGLYCERIDES: 68 MG/DL
WHITE BLOOD CELL COUNT: 5.2 THOUSAND/UL (ref 3.8–10.8)

## 2024-09-18 ENCOUNTER — HOSPITAL ENCOUNTER (OUTPATIENT)
Dept: MAMMOGRAPHY | Facility: HOSPITAL | Age: 74
Discharge: HOME | End: 2024-09-18
Attending: FAMILY MEDICINE
Payer: MEDICARE

## 2024-09-18 DIAGNOSIS — N60.01 CYST OF RIGHT BREAST: ICD-10-CM

## 2024-09-18 DIAGNOSIS — R92.8 OTHER ABNORMAL AND INCONCLUSIVE FINDINGS ON DIAGNOSTIC IMAGING OF BREAST: ICD-10-CM

## 2024-09-18 PROCEDURE — 77066 DX MAMMO INCL CAD BI: CPT | Performed by: FAMILY MEDICINE

## 2024-09-18 PROCEDURE — 77062 BREAST TOMOSYNTHESIS BI: CPT | Performed by: FAMILY MEDICINE

## 2024-09-18 PROCEDURE — 76642 ULTRASOUND BREAST LIMITED: CPT | Performed by: FAMILY MEDICINE

## 2024-09-20 ENCOUNTER — TELEPHONE (OUTPATIENT)
Dept: FAMILY MEDICINE CLINIC | Facility: CLINIC | Age: 74
End: 2024-09-20

## 2024-10-22 DIAGNOSIS — N60.01 CYST OF RIGHT BREAST: Primary | ICD-10-CM

## 2024-12-05 DIAGNOSIS — I10 PRIMARY HYPERTENSION: ICD-10-CM

## 2024-12-05 DIAGNOSIS — E11.9 TYPE 2 DIABETES MELLITUS WITHOUT COMPLICATION, WITHOUT LONG-TERM CURRENT USE OF INSULIN (HCC): ICD-10-CM

## 2024-12-09 RX ORDER — LOSARTAN POTASSIUM 50 MG/1
50 TABLET ORAL DAILY
Qty: 90 TABLET | Refills: 0 | Status: SHIPPED | OUTPATIENT
Start: 2024-12-09

## 2024-12-09 NOTE — TELEPHONE ENCOUNTER
Requesting Losartan 50mg  LOV: 7/12/24 Physical  RTC: 3 months  Last Relevant Labs: 7/16/24  Filled: 6/12/24 #90 with 1 refills    No future appointments.    Hypertension Medications Protocol [id number]/09/2024 03:41 PM   Protocol Details   Last BP reading less than 140/90    CMP or BMP in past 12 months    In person appointment or virtual visit in the past 12 mos or appointment in next 3 mos    EGFRCR or GFRAA > 50

## 2025-01-21 DIAGNOSIS — E11.3593 TYPE 2 DIABETES MELLITUS WITH PROLIFERATIVE RETINOPATHY OF BOTH EYES, WITHOUT LONG-TERM CURRENT USE OF INSULIN, MACULAR EDEMA PRESENCE UNSPECIFIED, UNSPECIFIED PROLIFERATIVE RETINOPATHY TYPE (HCC): ICD-10-CM

## 2025-01-22 RX ORDER — METFORMIN HYDROCHLORIDE 500 MG/1
500 TABLET, EXTENDED RELEASE ORAL 2 TIMES DAILY WITH MEALS
Qty: 180 TABLET | Refills: 1 | Status: SHIPPED | OUTPATIENT
Start: 2025-01-22

## 2025-02-08 DIAGNOSIS — E11.9 TYPE 2 DIABETES MELLITUS WITHOUT COMPLICATION, WITHOUT LONG-TERM CURRENT USE OF INSULIN (HCC): ICD-10-CM

## 2025-02-09 RX ORDER — ATORVASTATIN CALCIUM 10 MG/1
10 TABLET, FILM COATED ORAL DAILY
Qty: 90 TABLET | Refills: 3 | Status: SHIPPED | OUTPATIENT
Start: 2025-02-09

## 2025-03-03 DIAGNOSIS — I10 PRIMARY HYPERTENSION: ICD-10-CM

## 2025-03-03 DIAGNOSIS — E11.9 TYPE 2 DIABETES MELLITUS WITHOUT COMPLICATION, WITHOUT LONG-TERM CURRENT USE OF INSULIN (HCC): ICD-10-CM

## 2025-03-07 RX ORDER — LOSARTAN POTASSIUM 50 MG/1
50 TABLET ORAL DAILY
Qty: 90 TABLET | Refills: 0 | Status: SHIPPED | OUTPATIENT
Start: 2025-03-07

## 2025-03-07 NOTE — TELEPHONE ENCOUNTER
Requesting Losartan 50mg  LOV: 7/12/24 Physical  RTC: 3 months  Last Relevant Labs: 7/16/24  Filled: 12/9/24 #90 with 0 refills    No future appointments.    Hypertension Medications Protocol Urfxmp4603/07/2025 10:47 AM   Protocol Details   Last BP reading less than 140/90    CMP or BMP in past 12 months    In person appointment or virtual visit in the past 12 mos or appointment in next 3 mos    EGFRCR or GFRAA > 50    Medication is active on med list     Rx sent to pharmacy per protocol

## 2025-03-11 DIAGNOSIS — I10 PRIMARY HYPERTENSION: ICD-10-CM

## 2025-03-11 DIAGNOSIS — E11.9 TYPE 2 DIABETES MELLITUS WITHOUT COMPLICATION, WITHOUT LONG-TERM CURRENT USE OF INSULIN (HCC): ICD-10-CM

## 2025-03-13 RX ORDER — LOSARTAN POTASSIUM 50 MG/1
50 TABLET ORAL DAILY
Qty: 90 TABLET | Refills: 0 | OUTPATIENT
Start: 2025-03-13

## 2025-03-13 NOTE — TELEPHONE ENCOUNTER
Duplicate request - Rx sent 3/7/25    LOSARTAN 50 MG Oral Tab 90 tablet 0 3/7/2025 --    Sig - Route: TAKE 1 TABLET(50 MG) BY MOUTH DAILY - Oral    Sent to pharmacy as: Losartan Potassium 50 MG Oral Tablet (Cozaar)    E-Prescribing Status: Receipt confirmed by pharmacy (3/7/2025 10:47 AM CST)        [facility] #[id number] [place] [address] [address], [phone], [phone]

## 2025-03-18 ENCOUNTER — TELEPHONE (OUTPATIENT)
Dept: FAMILY MEDICINE CLINIC | Facility: CLINIC | Age: 75
End: 2025-03-18

## 2025-03-25 DIAGNOSIS — I10 PRIMARY HYPERTENSION: ICD-10-CM

## 2025-03-25 DIAGNOSIS — E11.9 TYPE 2 DIABETES MELLITUS WITHOUT COMPLICATION, WITHOUT LONG-TERM CURRENT USE OF INSULIN (HCC): ICD-10-CM

## 2025-03-26 ENCOUNTER — PATIENT MESSAGE (OUTPATIENT)
Dept: FAMILY MEDICINE CLINIC | Facility: CLINIC | Age: 75
End: 2025-03-26

## 2025-03-26 DIAGNOSIS — E11.9 TYPE 2 DIABETES MELLITUS WITHOUT COMPLICATION, WITHOUT LONG-TERM CURRENT USE OF INSULIN (HCC): ICD-10-CM

## 2025-03-26 DIAGNOSIS — I10 PRIMARY HYPERTENSION: ICD-10-CM

## 2025-03-26 RX ORDER — LOSARTAN POTASSIUM 50 MG/1
50 TABLET ORAL DAILY
Qty: 90 TABLET | Refills: 0 | OUTPATIENT
Start: 2025-03-26

## 2025-03-26 NOTE — TELEPHONE ENCOUNTER
Duplicate request - Rx sent 3/7/25    LOSARTAN 50 MG Oral Tab 90 tablet 0 3/7/2025 --    Sig - Route: TAKE 1 TABLET(50 MG) BY MOUTH DAILY - Oral    Sent to pharmacy as: Losartan Potassium 50 MG Oral Tablet (Cozaar)    E-Prescribing Status: Receipt confirmed by pharmacy (3/7/2025 10:47 AM CST)        [facility] [id number] [place] [address] [address], [phone], [phone]

## 2025-03-26 NOTE — TELEPHONE ENCOUNTER
Duplicate request - Rx sent 3/7/25 #90     Disp Refills Start End    LOSARTAN 50 MG Oral Tab 90 tablet 0 3/7/2025 --    Sig - Route: TAKE 1 TABLET(50 MG) BY MOUTH DAILY - Oral    Sent to pharmacy as: Losartan Potassium 50 MG Oral Tablet (Cozaar)    E-Prescribing Status: Receipt confirmed by pharmacy (3/7/2025 10:47 AM CST)    [facility] #[id number] [place], [place] - [facility] [address], [phone], [phone]

## 2025-03-27 RX ORDER — LOSARTAN POTASSIUM 50 MG/1
50 TABLET ORAL DAILY
Qty: 90 TABLET | Refills: 0 | Status: SHIPPED | OUTPATIENT
Start: 2025-03-27

## 2025-03-27 NOTE — TELEPHONE ENCOUNTER
Called [facility] pharmacy. Spoke to pharmacist. Inquiring if they received the Losartan prescription from 3/7/2025. Pharmacist states that they do not have it on file. New prescription was ordered. Pharmacist verbalized understanding.

## 2025-03-31 ENCOUNTER — HOSPITAL ENCOUNTER (OUTPATIENT)
Dept: MAMMOGRAPHY | Facility: HOSPITAL | Age: 75
Discharge: HOME | End: 2025-03-31
Attending: FAMILY MEDICINE
Payer: MEDICARE

## 2025-03-31 ENCOUNTER — ANCILLARY ORDERS (OUTPATIENT)
Dept: FAMILY MEDICINE CLINIC | Facility: CLINIC | Age: 75
End: 2025-03-31

## 2025-03-31 DIAGNOSIS — N60.01 CYST OF RIGHT BREAST: ICD-10-CM

## 2025-03-31 DIAGNOSIS — N60.01 CYST OF RIGHT BREAST: Primary | ICD-10-CM

## 2025-03-31 PROCEDURE — 76642 ULTRASOUND BREAST LIMITED: CPT | Performed by: FAMILY MEDICINE

## 2025-05-27 RX ORDER — METFORMIN HYDROCHLORIDE 500 MG/1
500 TABLET, EXTENDED RELEASE ORAL 2 TIMES DAILY WITH MEALS
COMMUNITY
Start: 2025-01-22

## 2025-05-27 RX ORDER — ALENDRONATE SODIUM 70 MG/1
70 TABLET ORAL
COMMUNITY
Start: 2015-06-04

## 2025-05-27 RX ORDER — ATORVASTATIN CALCIUM 10 MG/1
10 TABLET, FILM COATED ORAL DAILY
COMMUNITY
Start: 2025-02-09

## 2025-05-27 RX ORDER — LOSARTAN POTASSIUM 50 MG/1
50 TABLET ORAL DAILY
COMMUNITY
Start: 2025-03-27

## 2025-05-27 RX ORDER — ASPIRIN 81 MG/1
81 TABLET ORAL
COMMUNITY
Start: 2015-03-21

## 2025-05-28 ENCOUNTER — LAB SERVICES (OUTPATIENT)
Dept: LAB | Age: 75
End: 2025-05-28

## 2025-05-28 ENCOUNTER — APPOINTMENT (OUTPATIENT)
Dept: INTERNAL MEDICINE | Age: 75
End: 2025-05-28

## 2025-05-28 VITALS
DIASTOLIC BLOOD PRESSURE: 91 MMHG | BODY MASS INDEX: 29.03 KG/M2 | WEIGHT: 184.97 LBS | HEIGHT: 67 IN | RESPIRATION RATE: 16 BRPM | TEMPERATURE: 98.3 F | SYSTOLIC BLOOD PRESSURE: 166 MMHG | OXYGEN SATURATION: 98 % | HEART RATE: 87 BPM

## 2025-05-28 DIAGNOSIS — I10 PRIMARY HYPERTENSION: ICD-10-CM

## 2025-05-28 DIAGNOSIS — Z76.89 ENCOUNTER TO ESTABLISH CARE: Primary | ICD-10-CM

## 2025-05-28 DIAGNOSIS — E11.9 TYPE 2 DIABETES MELLITUS WITHOUT COMPLICATION, WITHOUT LONG-TERM CURRENT USE OF INSULIN (CMD): ICD-10-CM

## 2025-05-28 LAB
ALBUMIN SERPL-MCNC: 4.1 G/DL (ref 3.4–5)
ALBUMIN/GLOB SERPL: 1.3 {RATIO} (ref 1–2.4)
ALP SERPL-CCNC: 63 UNITS/L (ref 45–117)
ALT SERPL-CCNC: 20 UNITS/L
ANION GAP SERPL CALC-SCNC: 14 MMOL/L (ref 7–19)
AST SERPL-CCNC: 11 UNITS/L
BASOPHILS # BLD: 0.1 K/MCL (ref 0–0.3)
BASOPHILS NFR BLD: 1 %
BILIRUB SERPL-MCNC: 0.4 MG/DL (ref 0.2–1)
BUN SERPL-MCNC: 25 MG/DL (ref 6–20)
BUN/CREAT SERPL: 24 (ref 7–25)
CALCIUM SERPL-MCNC: 9.9 MG/DL (ref 8.4–10.2)
CHLORIDE SERPL-SCNC: 105 MMOL/L (ref 97–110)
CHOLEST SERPL-MCNC: 167 MG/DL
CHOLEST/HDLC SERPL: 2.3 {RATIO}
CO2 SERPL-SCNC: 28 MMOL/L (ref 21–32)
CREAT SERPL-MCNC: 1.03 MG/DL (ref 0.51–0.95)
CREAT UR-MCNC: 206.3 MG/DL
DEPRECATED RDW RBC: 42.2 FL (ref 39–50)
EGFRCR SERPLBLD CKD-EPI 2021: 57 ML/MIN/{1.73_M2}
EOSINOPHIL # BLD: 0.2 K/MCL (ref 0–0.5)
EOSINOPHIL NFR BLD: 4 %
ERYTHROCYTE [DISTWIDTH] IN BLOOD: 13.6 % (ref 11–15)
FASTING DURATION TIME PATIENT: 12 HOURS (ref 0–999)
GLOBULIN SER-MCNC: 3.2 G/DL (ref 2–4)
GLUCOSE SERPL-MCNC: 104 MG/DL (ref 70–99)
HBA1C MFR BLD: 6.1 % (ref 4.5–5.6)
HCT VFR BLD CALC: 38.7 % (ref 36–46.5)
HDLC SERPL-MCNC: 72 MG/DL
HGB BLD-MCNC: 12.4 G/DL (ref 12–15.5)
IMM GRANULOCYTES # BLD AUTO: 0 K/MCL (ref 0–0.2)
IMM GRANULOCYTES # BLD: 0 %
LDLC SERPL CALC-MCNC: 80 MG/DL
LYMPHOCYTES # BLD: 2 K/MCL (ref 1–4)
LYMPHOCYTES NFR BLD: 36 %
MCH RBC QN AUTO: 27.4 PG (ref 26–34)
MCHC RBC AUTO-ENTMCNC: 32 G/DL (ref 32–36.5)
MCV RBC AUTO: 85.6 FL (ref 78–100)
MICROALBUMIN UR-MCNC: 0.96 MG/DL
MICROALBUMIN/CREAT UR: 4.7 MG/G
MONOCYTES # BLD: 0.4 K/MCL (ref 0.3–0.9)
MONOCYTES NFR BLD: 7 %
NEUTROPHILS # BLD: 2.8 K/MCL (ref 1.8–7.7)
NEUTROPHILS NFR BLD: 52 %
NONHDLC SERPL-MCNC: 95 MG/DL
NRBC BLD MANUAL-RTO: 0 /100 WBC
PLATELET # BLD AUTO: 319 K/MCL (ref 140–450)
POTASSIUM SERPL-SCNC: 4.5 MMOL/L (ref 3.4–5.1)
PROT SERPL-MCNC: 7.3 G/DL (ref 6.4–8.2)
RBC # BLD: 4.52 MIL/MCL (ref 4–5.2)
SODIUM SERPL-SCNC: 142 MMOL/L (ref 135–145)
TRIGL SERPL-MCNC: 76 MG/DL
WBC # BLD: 5.6 K/MCL (ref 4.2–11)

## 2025-05-28 PROCEDURE — 80053 COMPREHEN METABOLIC PANEL: CPT | Performed by: INTERNAL MEDICINE

## 2025-05-28 PROCEDURE — 82043 UR ALBUMIN QUANTITATIVE: CPT | Performed by: INTERNAL MEDICINE

## 2025-05-28 PROCEDURE — 80061 LIPID PANEL: CPT | Performed by: INTERNAL MEDICINE

## 2025-05-28 PROCEDURE — 83036 HEMOGLOBIN GLYCOSYLATED A1C: CPT | Performed by: INTERNAL MEDICINE

## 2025-05-28 PROCEDURE — 85025 COMPLETE CBC W/AUTO DIFF WBC: CPT | Performed by: INTERNAL MEDICINE

## 2025-05-28 PROCEDURE — 82570 ASSAY OF URINE CREATININE: CPT | Performed by: INTERNAL MEDICINE

## 2025-05-28 PROCEDURE — 36415 COLL VENOUS BLD VENIPUNCTURE: CPT | Performed by: HOSPITALIST

## 2025-05-28 ASSESSMENT — PATIENT HEALTH QUESTIONNAIRE - PHQ9
CLINICAL INTERPRETATION OF PHQ2 SCORE: NO FURTHER SCREENING NEEDED
SUM OF ALL RESPONSES TO PHQ9 QUESTIONS 1 AND 2: 0
1. LITTLE INTEREST OR PLEASURE IN DOING THINGS: NOT AT ALL
SUM OF ALL RESPONSES TO PHQ9 QUESTIONS 1 AND 2: 0
2. FEELING DOWN, DEPRESSED OR HOPELESS: NOT AT ALL

## 2025-05-29 ENCOUNTER — RESULTS FOLLOW-UP (OUTPATIENT)
Dept: INTERNAL MEDICINE | Age: 75
End: 2025-05-29

## 2025-05-30 ENCOUNTER — APPOINTMENT (OUTPATIENT)
Dept: INTERNAL MEDICINE | Age: 75
End: 2025-05-30

## 2025-06-03 ENCOUNTER — APPOINTMENT (OUTPATIENT)
Dept: GENERAL RADIOLOGY | Facility: HOSPITAL | Age: 75
End: 2025-06-03
Attending: EMERGENCY MEDICINE
Payer: MEDICARE

## 2025-06-03 ENCOUNTER — APPOINTMENT (OUTPATIENT)
Dept: CT IMAGING | Facility: HOSPITAL | Age: 75
End: 2025-06-03
Attending: EMERGENCY MEDICINE
Payer: MEDICARE

## 2025-06-03 ENCOUNTER — HOSPITAL ENCOUNTER (EMERGENCY)
Facility: HOSPITAL | Age: 75
Discharge: HOME | End: 2025-06-03
Attending: EMERGENCY MEDICINE
Payer: MEDICARE

## 2025-06-03 VITALS
SYSTOLIC BLOOD PRESSURE: 168 MMHG | RESPIRATION RATE: 15 BRPM | HEIGHT: 66 IN | TEMPERATURE: 99 F | BODY MASS INDEX: 29.57 KG/M2 | DIASTOLIC BLOOD PRESSURE: 89 MMHG | WEIGHT: 184 LBS | HEART RATE: 85 BPM | OXYGEN SATURATION: 97 %

## 2025-06-03 DIAGNOSIS — I10 PRIMARY HYPERTENSION: Primary | ICD-10-CM

## 2025-06-03 LAB
ALBUMIN SERPL-MCNC: 4.5 G/DL (ref 3.2–4.8)
ALBUMIN/GLOB SERPL: 1.6 {RATIO} (ref 1–2)
ALP LIVER SERPL-CCNC: 53 U/L (ref 55–142)
ALT SERPL-CCNC: 10 U/L (ref 10–49)
ANION GAP SERPL CALC-SCNC: 12 MMOL/L (ref 0–18)
AST SERPL-CCNC: 16 U/L (ref ?–34)
BASOPHILS # BLD AUTO: 0.08 X10(3) UL (ref 0–0.2)
BASOPHILS NFR BLD AUTO: 1.1 %
BILIRUB SERPL-MCNC: 0.4 MG/DL (ref 0.2–1.1)
BUN BLD-MCNC: 18 MG/DL (ref 9–23)
CALCIUM BLD-MCNC: 9.4 MG/DL (ref 8.7–10.6)
CHLORIDE SERPL-SCNC: 103 MMOL/L (ref 98–112)
CO2 SERPL-SCNC: 22 MMOL/L (ref 21–32)
CREAT BLD-MCNC: 1.11 MG/DL (ref 0.55–1.02)
EGFRCR SERPLBLD CKD-EPI 2021: 52 ML/MIN/1.73M2 (ref 60–?)
EOSINOPHIL # BLD AUTO: 0.34 X10(3) UL (ref 0–0.7)
EOSINOPHIL NFR BLD AUTO: 4.8 %
ERYTHROCYTE [DISTWIDTH] IN BLOOD BY AUTOMATED COUNT: 13.2 %
GLOBULIN PLAS-MCNC: 2.8 G/DL (ref 2–3.5)
GLUCOSE BLD-MCNC: 101 MG/DL (ref 70–99)
GLUCOSE BLD-MCNC: 105 MG/DL (ref 70–99)
HCT VFR BLD AUTO: 36.2 % (ref 35–48)
HGB BLD-MCNC: 12.2 G/DL (ref 12–16)
IMM GRANULOCYTES # BLD AUTO: 0.02 X10(3) UL (ref 0–1)
IMM GRANULOCYTES NFR BLD: 0.3 %
LYMPHOCYTES # BLD AUTO: 2.5 X10(3) UL (ref 1–4)
LYMPHOCYTES NFR BLD AUTO: 35.6 %
MCH RBC QN AUTO: 28 PG (ref 26–34)
MCHC RBC AUTO-ENTMCNC: 33.7 G/DL (ref 31–37)
MCV RBC AUTO: 83.2 FL (ref 80–100)
MONOCYTES # BLD AUTO: 0.52 X10(3) UL (ref 0.1–1)
MONOCYTES NFR BLD AUTO: 7.4 %
NEUTROPHILS # BLD AUTO: 3.57 X10 (3) UL (ref 1.5–7.7)
NEUTROPHILS # BLD AUTO: 3.57 X10(3) UL (ref 1.5–7.7)
NEUTROPHILS NFR BLD AUTO: 50.8 %
OSMOLALITY SERPL CALC.SUM OF ELEC: 286 MOSM/KG (ref 275–295)
PLATELET # BLD AUTO: 275 10(3)UL (ref 150–450)
POTASSIUM SERPL-SCNC: 4.6 MMOL/L (ref 3.5–5.1)
PROT SERPL-MCNC: 7.3 G/DL (ref 5.7–8.2)
RBC # BLD AUTO: 4.35 X10(6)UL (ref 3.8–5.3)
SODIUM SERPL-SCNC: 137 MMOL/L (ref 136–145)
TROPONIN I SERPL HS-MCNC: 3 NG/L (ref ?–34)
WBC # BLD AUTO: 7 X10(3) UL (ref 4–11)

## 2025-06-03 PROCEDURE — 36415 COLL VENOUS BLD VENIPUNCTURE: CPT

## 2025-06-03 PROCEDURE — 70450 CT HEAD/BRAIN W/O DYE: CPT | Performed by: EMERGENCY MEDICINE

## 2025-06-03 PROCEDURE — 82962 GLUCOSE BLOOD TEST: CPT

## 2025-06-03 PROCEDURE — 85025 COMPLETE CBC W/AUTO DIFF WBC: CPT

## 2025-06-03 PROCEDURE — 71045 X-RAY EXAM CHEST 1 VIEW: CPT | Performed by: EMERGENCY MEDICINE

## 2025-06-03 PROCEDURE — 99285 EMERGENCY DEPT VISIT HI MDM: CPT

## 2025-06-03 PROCEDURE — 93005 ELECTROCARDIOGRAM TRACING: CPT

## 2025-06-03 PROCEDURE — 80053 COMPREHEN METABOLIC PANEL: CPT

## 2025-06-03 PROCEDURE — 85025 COMPLETE CBC W/AUTO DIFF WBC: CPT | Performed by: EMERGENCY MEDICINE

## 2025-06-03 PROCEDURE — 84484 ASSAY OF TROPONIN QUANT: CPT | Performed by: EMERGENCY MEDICINE

## 2025-06-03 PROCEDURE — 93010 ELECTROCARDIOGRAM REPORT: CPT

## 2025-06-03 PROCEDURE — 80053 COMPREHEN METABOLIC PANEL: CPT | Performed by: EMERGENCY MEDICINE

## 2025-06-03 RX ORDER — IBUPROFEN 200 MG
200 TABLET ORAL EVERY 6 HOURS PRN
COMMUNITY

## 2025-06-03 NOTE — ED INITIAL ASSESSMENT (HPI)
Pt presents to ER with hypertension that started about a week ago that has been getting increasingly worse. Today the reading was 202/112. Pt states she has been feeling dizzy and a few days ago she felt like she may pass out. Pt states she had issues reading due to blurriness today. Pt is experiencing headaches that make her feel nauseous.

## 2025-06-04 LAB
ATRIAL RATE: 93 BPM
P AXIS: 40 DEGREES
P-R INTERVAL: 182 MS
Q-T INTERVAL: 350 MS
QRS DURATION: 88 MS
QTC CALCULATION (BEZET): 435 MS
R AXIS: -23 DEGREES
T AXIS: 49 DEGREES
VENTRICULAR RATE: 93 BPM

## 2025-06-04 NOTE — ED PROVIDER NOTES
Patient Seen in: [facility] Emergency Department       The following individual(s) verbally consented to be recorded using ambient AI listening technology and understand that they can each withdraw their consent to this listening technology at any point by asking the clinician to turn off or pause the recording:    Patient name: Ines Riley  Additional names:        History  Chief Complaint   Patient presents with    Hypertension     Stated Complaint: high BP, mild headache    Subjective:   HPI     Ines Riley is a 74 year old female with hypertension who presents with elevated blood pressure and associated symptoms.    She has been experiencing elevated blood pressure readings, with a recent measurement of 202/112 mmHg taken by her daughter. She feels faint and clammy, with a 'weird feeling' occurring intermittently over the past week.  She had no syncope or near syncope. No chest pain or significant breathing problems. She has a mild dull headache that prompted her to take ibuprofen. She feels slow-moving and is cautious about moving too quickly due to fear of passing out.  In spite of this, the patient has been ambulatory on her own at home.    Her blood pressure prior to the 202/112 reading was around 209 mmHg, and she did not take her blood pressure yesterday. She takes her blood pressure medication, losartan, twice daily, with the first dose taken at approximately 8:30 AM.    In terms of her diet, she tries to eat properly, avoiding salt and sugar, and uses miladys candy to manage occasional stomach upset and belching. She attributes some of her stress to caring for her , who has had a stroke, and notes that she does a lot for him.    She reports feeling tingling sensations in her toes occasionally but confirms she can feel her legs and toes and has been walking around okay.        Objective:     Past Medical History:    Diabetes (HCC)    Essential hypertension    Hyperlipidemia               Past Surgical History:   Procedure Laterality Date          Colonoscopy      Colonoscopy N/A 2023    Procedure: COLONOSCOPY;  Surgeon: Valentin Ballesteros DO;  Location:  ENDOSCOPY    Hysterectomy                        Social History     Socioeconomic History    Marital status:    Tobacco Use    Smoking status: Never     Passive exposure: Never    Smokeless tobacco: Never   Vaping Use    Vaping status: Never Used   Substance and Sexual Activity    Alcohol use: Never    Drug use: Never    Sexual activity: Not Currently   Other Topics Concern    Caffeine Concern No    Exercise No    Seat Belt No    Special Diet No    Stress Concern No    Weight Concern No                                Physical Exam    ED Triage Vitals [25 1829]   BP (!) 177/95   Pulse 92   Resp 18   Temp 99 °F (37.2 °C)   Temp src Temporal   SpO2 99 %   O2 Device None (Room air)       Current Vitals:   Vital Signs  BP: (!) 168/89  Pulse: 85  Resp: 15  Temp: 99 °F (37.2 °C)  Temp src: Temporal  MAP (mmHg): (!) 113    Oxygen Therapy  SpO2: 97 %  O2 Device: None (Room air)            Physical Exam  Vitals and nursing note reviewed.   Constitutional:       Appearance: She is well-developed.   HENT:      Head: Normocephalic.   Cardiovascular:      Rate and Rhythm: Normal rate and regular rhythm.      Heart sounds: Normal heart sounds. No murmur heard.  Pulmonary:      Effort: Pulmonary effort is normal. No respiratory distress.      Breath sounds: Normal breath sounds.   Abdominal:      General: Bowel sounds are normal.      Palpations: Abdomen is soft.      Tenderness: There is no abdominal tenderness. There is no rebound.   Musculoskeletal:         General: No tenderness. Normal range of motion.      Cervical back: Normal range of motion and neck supple.   Lymphadenopathy:      Cervical: No cervical adenopathy.   Skin:     General: Skin is warm and dry.      Findings: No rash.   Neurological:      Mental  Status: She is alert and oriented to person, place, and time.      Sensory: No sensory deficit.                ED Course  Labs Reviewed   COMP METABOLIC PANEL (14) - Abnormal; Notable for the following components:       Result Value    Glucose 105 (*)     Creatinine 1.11 (*)     eGFR-Cr 52 (*)     Alkaline Phosphatase 53 (*)     All other components within normal limits   POCT GLUCOSE - Abnormal; Notable for the following components:    POC Glucose 101 (*)     All other components within normal limits   TROPONIN I HIGH SENSITIVITY - Normal   CBC WITH DIFFERENTIAL WITH PLATELET   RAINBOW DRAW LAVENDER   RAINBOW DRAW LIGHT GREEN     EKG    Rate, intervals and axes as noted on EKG Report.  Rate: 93  Rhythm: Sinus Rhythm  Reading: normal           ED Course as of 06/04/25 0147  ------------------------------------------------------------  Time: 06/03 2123  Value: Troponin I (High Sensitivity): 3  Comment: (Reviewed)  ------------------------------------------------------------  Time: 06/03 2123  Value: Comp Metabolic Panel (14)(!)  Comment: Unremarkable    ------------------------------------------------------------  Time: 06/03 2153  Value: CBC With Differential With Platelet  Comment: Unremarkable    ------------------------------------------------------------  Time: 06/03 2344  Value: XR CHEST AP PORTABLE  (CPT=71045)  Comment: Images were independently viewed by me and cardiomegaly.  Lungs are clear.  ------------------------------------------------------------  Time: 06/03 2344  Value: CT BRAIN OR HEAD (CPT=70450)  Comment: No intracranial hemorrhage.  Small vessel ischemic abnormalities are noted.     Patient remained comfortable in the emergency department with somewhat elevated blood pressures, but no evidence of discomfort or instability.                  MDM     Patient comes to the emergency department with vague malaise and dizziness along with transient significantly elevated blood pressures at home.   Differential diagnosis includes hypertensive crisis, encephalopathy, myocardial ischemia.  However, patient's workup in the emergency department was unremarkable and patient's blood pressure while being elevated was not severely elevated.  Patient was discharged home with instructions to follow-up with primary care physician and counseled that she should have long-term goals to moderate her blood pressure, but doing so abruptly and acutely would more likely cause harm than help.  Patient had no objective evidence of any acute endorgan damage.  She expressed understanding that she should return immediately for any acute change or worsening of symptoms.        Medical Decision Making      Disposition and Plan     Clinical Impression:  1. Primary hypertension         Disposition:  Discharge  6/3/2025 11:45 pm    Follow-up:  Acosta Pinto DO  [address] [address]  [phone]    Follow up            Medications Prescribed:  Discharge Medication List as of 6/3/2025 11:51 PM                Supplementary Documentation:

## 2025-06-09 SDOH — ECONOMIC STABILITY: FOOD INSECURITY: WITHIN THE PAST 12 MONTHS, THE FOOD YOU BOUGHT JUST DIDN'T LAST AND YOU DIDN'T HAVE MONEY TO GET MORE.: NEVER TRUE

## 2025-06-09 SDOH — ECONOMIC STABILITY: HOUSING INSECURITY: WHAT IS YOUR LIVING SITUATION TODAY?: I HAVE A STEADY PLACE TO LIVE

## 2025-06-09 SDOH — ECONOMIC STABILITY: TRANSPORTATION INSECURITY
IN THE PAST 12 MONTHS, HAS LACK OF RELIABLE TRANSPORTATION KEPT YOU FROM MEDICAL APPOINTMENTS, MEETINGS, WORK OR FROM GETTING THINGS NEEDED FOR DAILY LIVING?: NO

## 2025-06-09 SDOH — ECONOMIC STABILITY: HOUSING INSECURITY: DO YOU HAVE PROBLEMS WITH ANY OF THE FOLLOWING?: NONE OF THE ABOVE

## 2025-06-09 ASSESSMENT — SOCIAL DETERMINANTS OF HEALTH (SDOH): IN THE PAST 12 MONTHS, HAS THE ELECTRIC, GAS, OIL, OR WATER COMPANY THREATENED TO SHUT OFF SERVICE IN YOUR HOME?: NO

## 2025-06-11 ENCOUNTER — APPOINTMENT (OUTPATIENT)
Dept: INTERNAL MEDICINE | Age: 75
End: 2025-06-11

## 2025-06-11 VITALS
HEIGHT: 67 IN | TEMPERATURE: 98.7 F | DIASTOLIC BLOOD PRESSURE: 81 MMHG | SYSTOLIC BLOOD PRESSURE: 133 MMHG | HEART RATE: 87 BPM | BODY MASS INDEX: 28.17 KG/M2 | OXYGEN SATURATION: 100 % | WEIGHT: 179.45 LBS | RESPIRATION RATE: 16 BRPM

## 2025-06-11 DIAGNOSIS — I10 PRIMARY HYPERTENSION: Primary | ICD-10-CM

## 2025-06-11 DIAGNOSIS — E78.5 HYPERLIPIDEMIA ASSOCIATED WITH TYPE 2 DIABETES MELLITUS  (CMD): ICD-10-CM

## 2025-06-11 DIAGNOSIS — N18.31 TYPE 2 DIABETES MELLITUS WITH STAGE 3A CHRONIC KIDNEY DISEASE, WITHOUT LONG-TERM CURRENT USE OF INSULIN  (CMD): ICD-10-CM

## 2025-06-11 DIAGNOSIS — E11.22 TYPE 2 DIABETES MELLITUS WITH STAGE 3A CHRONIC KIDNEY DISEASE, WITHOUT LONG-TERM CURRENT USE OF INSULIN  (CMD): ICD-10-CM

## 2025-06-11 DIAGNOSIS — E11.69 HYPERLIPIDEMIA ASSOCIATED WITH TYPE 2 DIABETES MELLITUS  (CMD): ICD-10-CM

## 2025-06-11 DIAGNOSIS — N18.31 STAGE 3A CHRONIC KIDNEY DISEASE  (CMD): ICD-10-CM

## 2025-06-23 DIAGNOSIS — E11.9 TYPE 2 DIABETES MELLITUS WITHOUT COMPLICATION, WITHOUT LONG-TERM CURRENT USE OF INSULIN (HCC): ICD-10-CM

## 2025-06-23 DIAGNOSIS — I10 PRIMARY HYPERTENSION: ICD-10-CM

## 2025-06-24 ENCOUNTER — APPOINTMENT (OUTPATIENT)
Dept: OPTOMETRY | Age: 75
End: 2025-06-24
Attending: HOSPITALIST

## 2025-06-24 DIAGNOSIS — H43.812 VITREOUS DEGENERATION AND DETACHMENT, LEFT: ICD-10-CM

## 2025-06-24 DIAGNOSIS — H25.13 SENILE NUCLEAR SCLEROSIS, BILATERAL: ICD-10-CM

## 2025-06-24 DIAGNOSIS — E11.9 TYPE 2 DIABETES MELLITUS WITHOUT OPHTHALMIC MANIFESTATIONS (CMD): Primary | ICD-10-CM

## 2025-06-24 PROCEDURE — 99203 OFFICE O/P NEW LOW 30 MIN: CPT | Performed by: OPTOMETRIST

## 2025-06-24 ASSESSMENT — REFRACTION_WEARINGRX
OS_SPHERE: +1.50
OS_CYLINDER: SPHERE
OD_CYLINDER: SPHERE
OD_SPHERE: +1.50
SPECS_TYPE: NEAR SINGLE VISION

## 2025-06-24 ASSESSMENT — VISUAL ACUITY
OS_PH_SC+: -2
OS_PH_SC: 20/30
OD_CC: JAEGER 3
OS_CC: JAEGER 3
OD_SC+: -2
OS_SC: 20/50
METHOD: SNELLEN - LINEAR
OD_SC: 20/30
OS_SC+: -2

## 2025-06-24 ASSESSMENT — CONF VISUAL FIELD
OS_INFERIOR_TEMPORAL_RESTRICTION: 0
OS_NORMAL: 1
OS_INFERIOR_NASAL_RESTRICTION: 0
OD_INFERIOR_TEMPORAL_RESTRICTION: 0
OS_SUPERIOR_NASAL_RESTRICTION: 0
OD_NORMAL: 1
OS_SUPERIOR_TEMPORAL_RESTRICTION: 0
OD_INFERIOR_NASAL_RESTRICTION: 0
OD_SUPERIOR_TEMPORAL_RESTRICTION: 0
OD_SUPERIOR_NASAL_RESTRICTION: 0

## 2025-06-24 ASSESSMENT — EXTERNAL EXAM - RIGHT EYE: OD_EXAM: NORMAL

## 2025-06-24 ASSESSMENT — SLIT LAMP EXAM - LIDS
COMMENTS: DMC
COMMENTS: DMC

## 2025-06-24 ASSESSMENT — TONOMETRY
OS_IOP_MMHG: 12
OD_IOP_MMHG: 14
IOP_METHOD: APPLANATION

## 2025-06-24 ASSESSMENT — EXTERNAL EXAM - LEFT EYE: OS_EXAM: NORMAL

## 2025-06-26 RX ORDER — METFORMIN HYDROCHLORIDE 500 MG/1
500 TABLET, EXTENDED RELEASE ORAL 2 TIMES DAILY WITH MEALS
Qty: 180 TABLET | Refills: 0 | Status: SHIPPED | OUTPATIENT
Start: 2025-06-26

## 2025-06-26 RX ORDER — LOSARTAN POTASSIUM 50 MG/1
TABLET ORAL
Qty: 90 TABLET | Refills: 0 | Status: SHIPPED | OUTPATIENT
Start: 2025-06-26

## 2025-06-26 RX ORDER — LOSARTAN POTASSIUM 50 MG/1
50 TABLET ORAL DAILY
Qty: 90 TABLET | Refills: 0 | Status: SHIPPED | OUTPATIENT
Start: 2025-06-26

## 2025-06-26 NOTE — TELEPHONE ENCOUNTER
Requesting Losartan 50mg  LOV: 7/12/24 Physical  RTC: 3 months  Last Relevant Labs: 6/3/25  Filled: 3/27/25 #90 with 0 refills    No future appointments.    Hypertension Medications Protocol [id number] 11:58 AM   Protocol Details   Last BP reading less than 140/90    CMP or BMP in past 12 months    In person appointment or virtual visit in the past 12 mos or appointment in next 3 mos    EGFRCR or GFRAA > 50    Medication is active on med list     Rx sent to pharmacy per protocol

## 2025-09-09 ENCOUNTER — APPOINTMENT (OUTPATIENT)
Dept: INTERNAL MEDICINE | Age: 75
End: 2025-09-09

## 2026-06-26 ENCOUNTER — APPOINTMENT (OUTPATIENT)
Dept: OPTOMETRY | Age: 76
End: 2026-06-26
Attending: HOSPITALIST

## (undated) DEVICE — ENDOSCOPY PACK - LOWER: Brand: MEDLINE INDUSTRIES, INC.

## (undated) DEVICE — Device: Brand: DEFENDO AIR/WATER/SUCTION AND BIOPSY VALVE

## (undated) DEVICE — FILTERLINE NASAL ADULT O2/CO2

## (undated) DEVICE — 3M™ RED DOT™ MONITORING ELECTRODE WITH FOAM TAPE AND STICKY GEL 2570-3, 3/BAG, 200/CASE, 54/PLT: Brand: RED DOT™

## (undated) DEVICE — 1200CC GUARDIAN II: Brand: GUARDIAN